# Patient Record
Sex: FEMALE | Race: OTHER | Employment: OTHER | URBAN - NONMETROPOLITAN AREA
[De-identification: names, ages, dates, MRNs, and addresses within clinical notes are randomized per-mention and may not be internally consistent; named-entity substitution may affect disease eponyms.]

---

## 2019-02-21 ENCOUNTER — TELEPHONE (OUTPATIENT)
Dept: CARDIOLOGY | Age: 69
End: 2019-02-21

## 2019-02-22 ENCOUNTER — TELEPHONE (OUTPATIENT)
Dept: CARDIOLOGY | Age: 69
End: 2019-02-22

## 2019-02-22 RX ORDER — PIOGLITAZONE HCL AND METFORMIN HCL 850; 15 MG/1; MG/1
0.2 TABLET ORAL 4 TIMES DAILY
COMMUNITY
End: 2019-09-12

## 2019-02-22 RX ORDER — RAMIPRIL 10 MG/1
10 CAPSULE ORAL 2 TIMES DAILY
COMMUNITY
End: 2021-02-26 | Stop reason: ALTCHOICE

## 2019-02-22 RX ORDER — EZETIMIBE 10 MG/1
10 TABLET ORAL DAILY
COMMUNITY

## 2019-02-22 RX ORDER — CLOTRIMAZOLE 10 MG/1
10 LOZENGE ORAL; TOPICAL
COMMUNITY
End: 2019-02-26 | Stop reason: ALTCHOICE

## 2019-02-22 RX ORDER — ALBUTEROL SULFATE 0.63 MG/3ML
1 SOLUTION RESPIRATORY (INHALATION) EVERY 6 HOURS PRN
COMMUNITY
End: 2019-02-22 | Stop reason: CLARIF

## 2019-02-22 RX ORDER — PERPHENAZINE/AMITRIPTYLINE HCL 4MG-10MG
1000 TABLET ORAL DAILY
COMMUNITY
End: 2019-09-12

## 2019-02-22 RX ORDER — ATENOLOL 25 MG/1
25 TABLET ORAL DAILY
COMMUNITY
End: 2021-02-26 | Stop reason: ALTCHOICE

## 2019-02-22 RX ORDER — HYDROCHLOROTHIAZIDE 12.5 MG/1
12.5 CAPSULE, GELATIN COATED ORAL DAILY
Status: ON HOLD | COMMUNITY
End: 2019-10-25

## 2019-02-22 RX ORDER — PIOGLITAZONE HCL AND METFORMIN HCL 500; 15 MG/1; MG/1
1 TABLET ORAL 2 TIMES DAILY WITH MEALS
COMMUNITY
End: 2019-02-22 | Stop reason: CLARIF

## 2019-02-22 RX ORDER — FENOFIBRATE 145 MG/1
145 TABLET, COATED ORAL DAILY
COMMUNITY
End: 2019-09-12

## 2019-02-22 RX ORDER — FENOFIBRATE 200 MG/1
200 CAPSULE ORAL
COMMUNITY
End: 2019-02-22 | Stop reason: CLARIF

## 2019-02-22 RX ORDER — MONTELUKAST SODIUM 10 MG/1
10 TABLET ORAL NIGHTLY
COMMUNITY

## 2019-02-26 ENCOUNTER — OFFICE VISIT (OUTPATIENT)
Dept: CARDIOLOGY | Age: 69
End: 2019-02-26
Payer: MEDICARE

## 2019-02-26 VITALS
HEIGHT: 61 IN | WEIGHT: 293 LBS | SYSTOLIC BLOOD PRESSURE: 102 MMHG | HEART RATE: 86 BPM | BODY MASS INDEX: 55.32 KG/M2 | DIASTOLIC BLOOD PRESSURE: 60 MMHG

## 2019-02-26 DIAGNOSIS — I10 ESSENTIAL HYPERTENSION: ICD-10-CM

## 2019-02-26 DIAGNOSIS — I50.32 CHRONIC DIASTOLIC HEART FAILURE (HCC): Primary | ICD-10-CM

## 2019-02-26 DIAGNOSIS — E78.5 DYSLIPIDEMIA: ICD-10-CM

## 2019-02-26 PROBLEM — I50.9 CHF (CONGESTIVE HEART FAILURE) (HCC): Status: ACTIVE | Noted: 2019-02-26

## 2019-02-26 PROCEDURE — 4040F PNEUMOC VAC/ADMIN/RCVD: CPT | Performed by: NURSE PRACTITIONER

## 2019-02-26 PROCEDURE — G8417 CALC BMI ABV UP PARAM F/U: HCPCS | Performed by: NURSE PRACTITIONER

## 2019-02-26 PROCEDURE — 99203 OFFICE O/P NEW LOW 30 MIN: CPT | Performed by: NURSE PRACTITIONER

## 2019-02-26 PROCEDURE — 93000 ELECTROCARDIOGRAM COMPLETE: CPT | Performed by: NURSE PRACTITIONER

## 2019-02-26 PROCEDURE — G8427 DOCREV CUR MEDS BY ELIG CLIN: HCPCS | Performed by: NURSE PRACTITIONER

## 2019-02-26 PROCEDURE — 1123F ACP DISCUSS/DSCN MKR DOCD: CPT | Performed by: NURSE PRACTITIONER

## 2019-02-26 PROCEDURE — 1090F PRES/ABSN URINE INCON ASSESS: CPT | Performed by: NURSE PRACTITIONER

## 2019-02-26 PROCEDURE — G8484 FLU IMMUNIZE NO ADMIN: HCPCS | Performed by: NURSE PRACTITIONER

## 2019-02-26 PROCEDURE — 3017F COLORECTAL CA SCREEN DOC REV: CPT | Performed by: NURSE PRACTITIONER

## 2019-02-26 PROCEDURE — G8400 PT W/DXA NO RESULTS DOC: HCPCS | Performed by: NURSE PRACTITIONER

## 2019-02-26 PROCEDURE — 1036F TOBACCO NON-USER: CPT | Performed by: NURSE PRACTITIONER

## 2019-02-26 PROCEDURE — 1101F PT FALLS ASSESS-DOCD LE1/YR: CPT | Performed by: NURSE PRACTITIONER

## 2019-02-26 RX ORDER — CHOLECALCIFEROL (VITAMIN D3) 1250 MCG
50000 CAPSULE ORAL
COMMUNITY
End: 2021-02-26 | Stop reason: ALTCHOICE

## 2019-02-26 RX ORDER — PETROLATUM,WHITE/LANOLIN
2000 OINTMENT (GRAM) TOPICAL DAILY
COMMUNITY
End: 2019-09-12

## 2019-02-26 RX ORDER — LEVALBUTEROL INHALATION SOLUTION 1.25 MG/3ML
1 SOLUTION RESPIRATORY (INHALATION) EVERY 4 HOURS PRN
COMMUNITY

## 2019-02-26 RX ORDER — FUROSEMIDE 40 MG/1
40 TABLET ORAL 2 TIMES DAILY
COMMUNITY
Start: 2019-02-21 | End: 2021-02-26 | Stop reason: ALTCHOICE

## 2019-02-26 RX ORDER — BUPROPION HYDROCHLORIDE 150 MG/1
150 TABLET ORAL DAILY
COMMUNITY
End: 2019-09-12

## 2019-02-26 RX ORDER — ICOSAPENT ETHYL 1000 MG/1
2 CAPSULE ORAL DAILY
Status: ON HOLD | COMMUNITY
End: 2019-10-25

## 2019-02-27 ENCOUNTER — TELEPHONE (OUTPATIENT)
Dept: CARDIOLOGY | Age: 69
End: 2019-02-27

## 2019-02-27 DIAGNOSIS — I50.42 CHRONIC COMBINED SYSTOLIC (CONGESTIVE) AND DIASTOLIC (CONGESTIVE) HEART FAILURE (HCC): Primary | ICD-10-CM

## 2019-05-09 ENCOUNTER — OFFICE VISIT (OUTPATIENT)
Dept: CARDIOLOGY | Age: 69
End: 2019-05-09
Payer: MEDICARE

## 2019-05-09 VITALS
BODY MASS INDEX: 47.09 KG/M2 | DIASTOLIC BLOOD PRESSURE: 74 MMHG | WEIGHT: 293 LBS | HEART RATE: 92 BPM | HEIGHT: 66 IN | SYSTOLIC BLOOD PRESSURE: 112 MMHG

## 2019-05-09 DIAGNOSIS — E78.5 DYSLIPIDEMIA: ICD-10-CM

## 2019-05-09 DIAGNOSIS — I51.89 DIASTOLIC DYSFUNCTION: ICD-10-CM

## 2019-05-09 DIAGNOSIS — I10 ESSENTIAL HYPERTENSION: Primary | ICD-10-CM

## 2019-05-09 DIAGNOSIS — Z72.0 TOBACCO ABUSE: ICD-10-CM

## 2019-05-09 PROBLEM — I50.9 CHF (CONGESTIVE HEART FAILURE) (HCC): Status: RESOLVED | Noted: 2019-02-26 | Resolved: 2019-05-09

## 2019-05-09 PROCEDURE — G8400 PT W/DXA NO RESULTS DOC: HCPCS | Performed by: NURSE PRACTITIONER

## 2019-05-09 PROCEDURE — 1123F ACP DISCUSS/DSCN MKR DOCD: CPT | Performed by: NURSE PRACTITIONER

## 2019-05-09 PROCEDURE — 1090F PRES/ABSN URINE INCON ASSESS: CPT | Performed by: NURSE PRACTITIONER

## 2019-05-09 PROCEDURE — G8417 CALC BMI ABV UP PARAM F/U: HCPCS | Performed by: NURSE PRACTITIONER

## 2019-05-09 PROCEDURE — G8427 DOCREV CUR MEDS BY ELIG CLIN: HCPCS | Performed by: NURSE PRACTITIONER

## 2019-05-09 PROCEDURE — 4040F PNEUMOC VAC/ADMIN/RCVD: CPT | Performed by: NURSE PRACTITIONER

## 2019-05-09 PROCEDURE — 1036F TOBACCO NON-USER: CPT | Performed by: NURSE PRACTITIONER

## 2019-05-09 PROCEDURE — 3017F COLORECTAL CA SCREEN DOC REV: CPT | Performed by: NURSE PRACTITIONER

## 2019-05-09 PROCEDURE — 99213 OFFICE O/P EST LOW 20 MIN: CPT | Performed by: NURSE PRACTITIONER

## 2019-05-09 RX ORDER — ALENDRONATE SODIUM 70 MG/1
70 TABLET ORAL
COMMUNITY
End: 2019-09-12

## 2019-05-09 RX ORDER — MULTIVITAMIN WITH IRON
250 TABLET ORAL DAILY
COMMUNITY
End: 2021-02-26 | Stop reason: ALTCHOICE

## 2019-05-09 NOTE — PROGRESS NOTES
Dear Joey Holt, [address], APRN - CNP,    Thank you for allowing me to participate in the care of Ms. Lm Snyder. She presents today at the [address] [place] [address] in the [facility]. As you know, Ms. Frederick Varma is a 76 y.o. female with history of hypertension, hyperlipidemia, diabetes, COPD and  hypomagnesemia who presents with the chief complaint of 2 month follow up. HTN-runs well   HLD-on tricor , pcp manages   Diastolic dysfunction-last Echo showed normal EF with diastolic dysfunction. Has SOA- no change. Weighs daily. Watching sodium and fluid intake. COPD-on oxygen     She otherwise denies chest pain, PND, orthopnea, syncope or near syncope. She has no other complaints. Review of Systems    Constitutional: Negative for fever, chills, diaphoresis, activity change, appetite change, fatigue and unexpected weight change. Eyes: Negative for photophobia, pain, redness and visual disturbance. Respiratory: Negative for apnea, cough, chest tightness,+ shortness of breath-chronic, wheezing and stridor. Cardiovascular: Negative for chest pain, palpitations and leg swelling. Gastrointestinal: Negative for abdominal distention. Genitourinary: Negative for dysuria, urgency and frequency. Musculoskeletal: Negative for myalgias, arthralgias and gait problem. Skin: Negative for color change, pallor, rash and wound. Neurological: Negative for dizziness, tremors, speech difficulty, weakness and numbness. Hematological: Does not bruise/bleed easily. Psychiatric/Behavioral: Negative.         Past Medical History:   Diagnosis Date    Arthritis     Chronic congestive heart failure with left ventricular diastolic dysfunction (HCC)     COPD with acute exacerbation (HCC)     Dyspnea     Essential hypertension     Hypomagnesemia     Type 2 diabetes mellitus ([facility] [address]        Past Surgical History:   Procedure Laterality Date    CYST REMOVAL  TONSILLECTOMY         History reviewed. No pertinent family history.     Social History     Socioeconomic History    Marital status:      Spouse name: Not on file    Number of children: Not on file    Years of education: Not on file    Highest education level: Not on file   Occupational History    Not on file   Social Needs    Financial resource strain: Not on file    Food insecurity:     Worry: Not on file     Inability: Not on file    Transportation needs:     Medical: Not on file     Non-medical: Not on file   Tobacco Use    Smoking status: Former Smoker     Packs/day: 1.50     Last attempt to quit: 2019     Years since quittin.2    Smokeless tobacco: Never Used   Substance and Sexual Activity    Alcohol use: Not on file    Drug use: Not on file    Sexual activity: Not on file   Lifestyle    Physical activity:     Days per week: Not on file     Minutes per session: Not on file    Stress: Not on file   Relationships    Social connections:     Talks on phone: Not on file     Gets together: Not on file     Attends Orthodox service: Not on file     Active member of club or organization: Not on file     Attends meetings of clubs or organizations: Not on file     Relationship status: Not on file    Intimate partner violence:     Fear of current or ex partner: Not on file     Emotionally abused: Not on file     Physically abused: Not on file     Forced sexual activity: Not on file   Other Topics Concern    Not on file   Social History Narrative    Not on file       Allergies   Allergen Reactions    Grapefruit Concentrate     Tetanus Immune Globulin     Zithromax [Azithromycin] Nausea Only         Current Outpatient Medications:     magnesium (MAGNESIUM-OXIDE) 250 MG TABS tablet, Take 250 mg by mouth daily, Disp: , Rfl:     Linaclotide (LINZESS) 72 MCG CAPS, Take 72 mcg by mouth daily, Disp: , Rfl:     alendronate (FOSAMAX) 70 MG tablet, Take 70 mg by mouth every 7 days, Disp: , Rfl:     Biotin 5000 MCG CAPS, Take 5,000 mcg by mouth daily, Disp: , Rfl:     buPROPion (WELLBUTRIN XL) 150 MG extended release tablet, Take 150 mg by mouth daily, Disp: , Rfl:     nystatin-triamcinolone (MYCOLOG II) 768005-9.1 UNIT/GM-% cream, Apply topically 4 times daily Apply topically 4 times daily. , Disp: , Rfl:     Icosapent Ethyl (VASCEPA) 1 g CAPS capsule, Take 2 capsules by mouth daily , Disp: , Rfl:     Cholecalciferol (VITAMIN D3) 01838 units CAPS, Take 50,000 Units by mouth once a week, Disp: , Rfl:     Potassium 99 MG TABS, Take 99 mg by mouth daily, Disp: , Rfl:     Probiotic Product (PROBIOTIC ADVANCED PO), Take by mouth 2 times daily, Disp: , Rfl:     Glucosamine Sulfate 1000 MG CAPS, Take 2,000 mg by mouth daily, Disp: , Rfl:     levalbuterol (XOPENEX) 1.25 MG/3ML nebulizer solution, Inhale 1 ampule into the lungs 3 times daily, Disp: , Rfl:     CANNABIDIOL PO, Take by mouth, Disp: , Rfl:     albuterol (PROVENTIL) 2 MG tablet, Take 2 mg by mouth 3 times daily, Disp: , Rfl:     aspirin 81 MG tablet, Take 81 mg by mouth 2 times daily, Disp: , Rfl:     atenolol (TENORMIN) 25 MG tablet, Take 25 mg by mouth daily, Disp: , Rfl:     Coconut Oil 1000 MG CAPS, Take 1,000 mg by mouth daily, Disp: , Rfl:     ezetimibe (ZETIA) 10 MG tablet, Take 10 mg by mouth daily, Disp: , Rfl:     fenofibrate (TRICOR) 145 MG tablet, Take 145 mg by mouth daily, Disp: , Rfl:     hydrochlorothiazide (MICROZIDE) 12.5 MG capsule, Take 12.5 mg by mouth daily, Disp: , Rfl:     montelukast (SINGULAIR) 10 MG tablet, Take 10 mg by mouth nightly, Disp: , Rfl:     Prenatal Vit-Fe Fumarate-FA (PX PRENATAL MULTIVITAMINS PO), Take by mouth, Disp: , Rfl:     pioglitazone-metformin (ACTOPLUS MET)  MG per tablet, Take 0.2 tablets by mouth 4 times daily , Disp: , Rfl:     ramipril (ALTACE) 10 MG capsule, Take 10 mg by mouth 2 times daily, Disp: , Rfl:     furosemide (LASIX) 40 MG tablet, Take 40 mg by mouth 2 times daily, Disp: , Rfl:     PE:  Vitals:    05/09/19 1025   BP: 112/74   Pulse: 92       Estimated body mass index is 48.42 kg/m² as calculated from the following:    Height as of this encounter: 5' 6\" (1.676 m). Weight as of this encounter: 300 lb (136.1 kg). Constitutional: She is oriented to person, place, and time. She appears well-developed and well-nourished in no acute distress. Morbidly obese  Head: Normocephalic and atraumatic. Neck:  Neck supple without JVD present. Cardiovascular: Normal rate, regular rhythm, normal heart sounds. no murmur ascultated. No gallop and no friction rub.  no carotid bruits. trace peripheral edema. Pulmonary/Chest:  Lungs clear to auscultation bilaterally without evidence of respiratory distress. She without wheezes. She without rales or ronchi. Musculoskeletal: Normal range of motion. Gait is normal with walker assitive device. Neurological: She is alert and oriented to person, place, and time. Skin: Skin is warm and dry without rash or pallor. Psychiatric: She has a normal mood and affect. Her behavior is normal. Thought content normal.     No results found for: CREATININE, HGB, PROBNP    Assessment    1. Essential hypertension    2. Dyslipidemia    3. Diastolic dysfunction    4. Tobacco abuse          Plan:    Diastolic dysfunction-watching sodium, weighing daily, watching sodium. BP controlled. Hypertension-controlled, no change  Dyslipidemia-PCP manages. Tobacco abuse-patient was smoking 1-1/2-2 packs per day for 45-50 years quitting on 2/1/19. She is however vaping but reports it is very rare now. COPD-On home oxygen. Disposition - RTC in 6 months or sooner if needed    Please do not hesitate to contact me for any questions or concerns. Sincerely yours,    KAMLESH Calzada    This dictation was generated by voice recognition computer software.  Although all attempts are made to edit dictation for accuracy, there may be errors in the transcription that are not intended.

## 2019-09-12 ENCOUNTER — OFFICE VISIT (OUTPATIENT)
Dept: CARDIOLOGY | Age: 69
End: 2019-09-12
Payer: MEDICARE

## 2019-09-12 VITALS
WEIGHT: 293 LBS | SYSTOLIC BLOOD PRESSURE: 148 MMHG | HEIGHT: 67 IN | BODY MASS INDEX: 45.99 KG/M2 | DIASTOLIC BLOOD PRESSURE: 98 MMHG | HEART RATE: 75 BPM

## 2019-09-12 DIAGNOSIS — E78.2 MIXED HYPERLIPIDEMIA: ICD-10-CM

## 2019-09-12 DIAGNOSIS — I10 ESSENTIAL HYPERTENSION: ICD-10-CM

## 2019-09-12 DIAGNOSIS — E66.01 CLASS 3 SEVERE OBESITY DUE TO EXCESS CALORIES WITH SERIOUS COMORBIDITY AND BODY MASS INDEX (BMI) OF 45.0 TO 49.9 IN ADULT (HCC): ICD-10-CM

## 2019-09-12 DIAGNOSIS — I50.30 DIASTOLIC HEART FAILURE, UNSPECIFIED HF CHRONICITY (HCC): Primary | ICD-10-CM

## 2019-09-12 DIAGNOSIS — Z87.448 HX OF CHRONIC KIDNEY DISEASE: ICD-10-CM

## 2019-09-12 DIAGNOSIS — R06.02 SOB (SHORTNESS OF BREATH): ICD-10-CM

## 2019-09-12 PROBLEM — E66.813 CLASS 3 SEVERE OBESITY DUE TO EXCESS CALORIES WITH SERIOUS COMORBIDITY AND BODY MASS INDEX (BMI) OF 45.0 TO 49.9 IN ADULT: Status: ACTIVE | Noted: 2019-09-12

## 2019-09-12 PROCEDURE — 99214 OFFICE O/P EST MOD 30 MIN: CPT | Performed by: NURSE PRACTITIONER

## 2019-09-12 PROCEDURE — G8400 PT W/DXA NO RESULTS DOC: HCPCS | Performed by: NURSE PRACTITIONER

## 2019-09-12 PROCEDURE — 1036F TOBACCO NON-USER: CPT | Performed by: NURSE PRACTITIONER

## 2019-09-12 PROCEDURE — G8417 CALC BMI ABV UP PARAM F/U: HCPCS | Performed by: NURSE PRACTITIONER

## 2019-09-12 PROCEDURE — G8427 DOCREV CUR MEDS BY ELIG CLIN: HCPCS | Performed by: NURSE PRACTITIONER

## 2019-09-12 PROCEDURE — 1090F PRES/ABSN URINE INCON ASSESS: CPT | Performed by: NURSE PRACTITIONER

## 2019-09-12 PROCEDURE — 3017F COLORECTAL CA SCREEN DOC REV: CPT | Performed by: NURSE PRACTITIONER

## 2019-09-12 PROCEDURE — 1123F ACP DISCUSS/DSCN MKR DOCD: CPT | Performed by: NURSE PRACTITIONER

## 2019-09-12 PROCEDURE — 4040F PNEUMOC VAC/ADMIN/RCVD: CPT | Performed by: NURSE PRACTITIONER

## 2019-09-12 RX ORDER — ALBUTEROL SULFATE 0.63 MG/3ML
1 SOLUTION RESPIRATORY (INHALATION) 3 TIMES DAILY PRN
Status: ON HOLD | COMMUNITY
End: 2019-10-25

## 2019-09-12 RX ORDER — GEMFIBROZIL 600 MG/1
600 TABLET, FILM COATED ORAL
COMMUNITY

## 2019-09-12 RX ORDER — PIOGLITAZONEHYDROCHLORIDE 45 MG/1
45 TABLET ORAL DAILY
Status: ON HOLD | COMMUNITY
End: 2019-10-25

## 2019-09-12 RX ORDER — FOLIC ACID 1 MG/1
1 TABLET ORAL DAILY
Status: ON HOLD | COMMUNITY
End: 2019-10-25

## 2019-09-12 NOTE — PATIENT INSTRUCTIONS
know your test results and keep a list of the medicines you take. How can you care for yourself at home? Watch for changes in your weight and condition  · Weigh yourself without clothing at the same time each day. Record your weight. Call your doctor if you have sudden weight gain, such as more than 2 to 3 pounds in a day or 5 pounds in a week. (Your doctor may suggest a different range of weight gain.) A sudden weight gain may mean that your heart failure is getting worse. · Keep a daily record of your symptoms. Write down any changes in how you feel, such as new shortness of breath, cough, or problems eating. Also record if your ankles are more swollen than usual and if you feel more tired than usual. Note anything that you ate or did that could have triggered these changes. Limit sodium  Sodium causes your body to hold on to extra water. This may cause your heart failure symptoms to get worse. People get most of their sodium from processed foods. Fast food and restaurant meals also tend to be very high in sodium. · Your doctor may suggest that you limit sodium to 2,000 milligrams (mg) a day or less. That is less than 1 teaspoon of salt a day, including all the salt you eat in cooking or in packaged foods. · Read food labels on cans and food packages. They tell you how much sodium you get in one serving. Check the serving size. If you eat more than one serving, you are getting more sodium. · Be aware that sodium can come in forms other than salt, including monosodium glutamate (MSG), sodium citrate, and sodium bicarbonate (baking soda). MSG is often added to Asian food. You can sometimes ask for food without MSG or salt. · Slowly reducing salt will help you adjust to the taste. Take the salt shaker off the table. · Flavor your food with garlic, lemon juice, onion, vinegar, herbs, and spices instead of salt.  Do not use soy sauce, steak sauce, onion salt, garlic salt, mustard, or ketchup on your food,

## 2019-09-12 NOTE — PROGRESS NOTES
Kory Almanza has the following history as recorded in [facility]:  Patient Active Problem List   Diagnosis Code    Essential hypertension B72    Diastolic heart failure (HCC) I50.30    Mixed hyperlipidemia E78.2    Hx of chronic kidney disease Z87.448    Class 3 severe obesity due to excess calories with serious comorbidity and body mass index (BMI) of 45.0 to 49.9 in adult ([facility] [address] E66.01, Z68.42    SOB (shortness of breath) R06.02     Past Medical History:   Diagnosis Date    Arthritis     Chronic congestive heart failure with left ventricular diastolic dysfunction ([facility] [address]     COPD with acute exacerbation (HCC)     Dyspnea     Essential hypertension     Hypomagnesemia     Osteoporosis     Type 2 diabetes mellitus ([facility] [address]      Past Surgical History:   Procedure Laterality Date    CYST REMOVAL      TONSILLECTOMY       History reviewed. No pertinent family history. Social History     Tobacco Use    Smoking status: Former Smoker     Packs/day: 1.50     Last attempt to quit: 2019     Years since quittin.6    Smokeless tobacco: Never Used   Substance Use Topics    Alcohol use: Not on file      Current Outpatient Medications   Medication Sig Dispense Refill    albuterol (ACCUNEB) 0.63 MG/3ML nebulizer solution albuterol sulfate      Fluticasone-Umeclidin-Vilant (TRELEGY ELLIPTA IN) Inhale into the lungs      gemfibrozil (LOPID) 600 MG tablet Take 600 mg by mouth 2 times daily (before meals)      folic acid (FOLVITE) 1 MG tablet Take 1 mg by mouth daily      pioglitazone (ACTOS) 45 MG tablet Take 45 mg by mouth daily      magnesium (MAGNESIUM-OXIDE) 250 MG TABS tablet Take 250 mg by mouth daily      Linaclotide (LINZESS) 72 MCG CAPS Take 72 mcg by mouth daily      nystatin-triamcinolone (MYCOLOG II) 859762-3.1 UNIT/GM-% cream Apply topically 4 times daily Apply topically 4 times daily.       Icosapent Ethyl (VASCEPA) 1 g CAPS capsule Take 2 capsules by mouth daily       Cholecalciferol (VITAMIN D3) 18289 units CAPS Take 150,000 Units by mouth once a week       Potassium 99 MG TABS Take 99 mg by mouth daily      Probiotic Product (PROBIOTIC ADVANCED PO) Take by mouth 2 times daily      levalbuterol (XOPENEX) 1.25 MG/3ML nebulizer solution Inhale 1 ampule into the lungs 3 times daily      CANNABIDIOL PO Take by mouth      albuterol (PROVENTIL) 2 MG tablet Take 2 mg by mouth 3 times daily      aspirin 81 MG tablet Take 81 mg by mouth 2 times daily      atenolol (TENORMIN) 25 MG tablet Take 25 mg by mouth daily      ezetimibe (ZETIA) 10 MG tablet Take 10 mg by mouth daily      hydrochlorothiazide (MICROZIDE) 12.5 MG capsule Take 12.5 mg by mouth daily      montelukast (SINGULAIR) 10 MG tablet Take 10 mg by mouth nightly      Prenatal Vit-Fe Fumarate-FA (PX PRENATAL MULTIVITAMINS PO) Take by mouth      ramipril (ALTACE) 10 MG capsule Take 10 mg by mouth 2 times daily      furosemide (LASIX) 40 MG tablet Take 40 mg by mouth 2 times daily       No current facility-administered medications for this visit. Allergies: Grapefruit concentrate; Tetanus immune globulin; and Zithromax [azithromycin]    Review of Systems  Constitutional - no appetite change, or unexpected weight change. No fever, chills or diaphoresis. No significant change in activity level or new onset of fatigue. HEENT - no significant rhinorrhea or epistaxis. No tinnitus or significant hearing loss. Eyes - no sudden vision change or amaurosis. No corneal arcus, xantholasma, subconjunctival hemorrhage or discharge. Respiratory - no significant wheezing, stridor, apnea or cough. +chronic but stable AUGUSTINE. Cardiovascular - no exertional chest pain to suggest myocardial ischemia. No orthopnea or PND. No sensation of sustained arrythmia. No occurrence of slow heart rate. No palpitations. No claudication. Gastrointestinal - no abdominal swelling or pain. No blood in stool.  No severe constipation, diarrhea, nausea,

## 2019-10-11 ENCOUNTER — HOSPITAL ENCOUNTER (OUTPATIENT)
Dept: NUCLEAR MEDICINE | Age: 69
Discharge: HOME | End: 2019-10-13
Payer: MEDICARE

## 2019-10-11 ENCOUNTER — HOSPITAL ENCOUNTER (OUTPATIENT)
Dept: NON INVASIVE DIAGNOSTICS | Age: 69
Discharge: HOME | End: 2019-10-11
Payer: MEDICARE

## 2019-10-11 DIAGNOSIS — I50.30 DIASTOLIC HEART FAILURE, UNSPECIFIED HF CHRONICITY (HCC): ICD-10-CM

## 2019-10-11 DIAGNOSIS — I10 ESSENTIAL HYPERTENSION: ICD-10-CM

## 2019-10-11 DIAGNOSIS — R06.02 SOB (SHORTNESS OF BREATH): ICD-10-CM

## 2019-10-11 PROCEDURE — 3430000000 HC RX DIAGNOSTIC RADIOPHARMACEUTICAL: Performed by: NURSE PRACTITIONER

## 2019-10-11 PROCEDURE — 78452 HT MUSCLE IMAGE SPECT MULT: CPT

## 2019-10-11 PROCEDURE — 93017 CV STRESS TEST TRACING ONLY: CPT

## 2019-10-11 PROCEDURE — A9500 TC99M SESTAMIBI: HCPCS | Performed by: NURSE PRACTITIONER

## 2019-10-11 PROCEDURE — 6360000002 HC RX W HCPCS: Performed by: INTERNAL MEDICINE

## 2019-10-11 RX ADMIN — TETRAKIS(2-METHOXYISOBUTYLISOCYANIDE)COPPER(I) TETRAFLUOROBORATE 10 MILLICURIE: 1 INJECTION, POWDER, LYOPHILIZED, FOR SOLUTION INTRAVENOUS at 14:27

## 2019-10-11 RX ADMIN — TETRAKIS(2-METHOXYISOBUTYLISOCYANIDE)COPPER(I) TETRAFLUOROBORATE 30 MILLICURIE: 1 INJECTION, POWDER, LYOPHILIZED, FOR SOLUTION INTRAVENOUS at 14:26

## 2019-10-11 RX ADMIN — REGADENOSON 0.4 MG: 0.08 INJECTION, SOLUTION INTRAVENOUS at 14:00

## 2019-10-13 LAB
LV EF: 60 %
LVEF MODALITY: NORMAL

## 2019-10-15 ENCOUNTER — OFFICE VISIT (OUTPATIENT)
Dept: CARDIOLOGY | Age: 69
End: 2019-10-15
Payer: MEDICARE

## 2019-10-15 VITALS
BODY MASS INDEX: 45.64 KG/M2 | SYSTOLIC BLOOD PRESSURE: 80 MMHG | HEART RATE: 68 BPM | WEIGHT: 284 LBS | DIASTOLIC BLOOD PRESSURE: 62 MMHG | HEIGHT: 66 IN

## 2019-10-15 DIAGNOSIS — R06.02 SOB (SHORTNESS OF BREATH): ICD-10-CM

## 2019-10-15 DIAGNOSIS — I50.30 DIASTOLIC HEART FAILURE, UNSPECIFIED HF CHRONICITY (HCC): ICD-10-CM

## 2019-10-15 DIAGNOSIS — Z82.49 FAMILY HISTORY OF CORONARY ARTERY DISEASE: ICD-10-CM

## 2019-10-15 DIAGNOSIS — R94.39 ABNORMAL NUCLEAR STRESS TEST: Primary | ICD-10-CM

## 2019-10-15 DIAGNOSIS — E78.2 MIXED HYPERLIPIDEMIA: ICD-10-CM

## 2019-10-15 DIAGNOSIS — I10 ESSENTIAL HYPERTENSION: ICD-10-CM

## 2019-10-15 PROCEDURE — 3017F COLORECTAL CA SCREEN DOC REV: CPT | Performed by: NURSE PRACTITIONER

## 2019-10-15 PROCEDURE — 1036F TOBACCO NON-USER: CPT | Performed by: NURSE PRACTITIONER

## 2019-10-15 PROCEDURE — G8400 PT W/DXA NO RESULTS DOC: HCPCS | Performed by: NURSE PRACTITIONER

## 2019-10-15 PROCEDURE — G8417 CALC BMI ABV UP PARAM F/U: HCPCS | Performed by: NURSE PRACTITIONER

## 2019-10-15 PROCEDURE — G8598 ASA/ANTIPLAT THER USED: HCPCS | Performed by: NURSE PRACTITIONER

## 2019-10-15 PROCEDURE — 99214 OFFICE O/P EST MOD 30 MIN: CPT | Performed by: NURSE PRACTITIONER

## 2019-10-15 PROCEDURE — 1090F PRES/ABSN URINE INCON ASSESS: CPT | Performed by: NURSE PRACTITIONER

## 2019-10-15 PROCEDURE — 1123F ACP DISCUSS/DSCN MKR DOCD: CPT | Performed by: NURSE PRACTITIONER

## 2019-10-15 PROCEDURE — G8484 FLU IMMUNIZE NO ADMIN: HCPCS | Performed by: NURSE PRACTITIONER

## 2019-10-15 PROCEDURE — 4040F PNEUMOC VAC/ADMIN/RCVD: CPT | Performed by: NURSE PRACTITIONER

## 2019-10-15 PROCEDURE — G8427 DOCREV CUR MEDS BY ELIG CLIN: HCPCS | Performed by: NURSE PRACTITIONER

## 2019-10-15 RX ORDER — FUROSEMIDE 40 MG/1
40 TABLET ORAL 2 TIMES DAILY
COMMUNITY

## 2019-10-15 RX ORDER — GLIMEPIRIDE 2 MG/1
2 TABLET ORAL 2 TIMES DAILY
COMMUNITY

## 2019-10-15 SDOH — HEALTH STABILITY: MENTAL HEALTH: HOW OFTEN DO YOU HAVE A DRINK CONTAINING ALCOHOL?: NEVER

## 2019-10-16 ENCOUNTER — TELEPHONE (OUTPATIENT)
Dept: CARDIOLOGY | Age: 69
End: 2019-10-16

## 2019-10-16 ENCOUNTER — OFFICE VISIT (OUTPATIENT)
Dept: CARDIOLOGY | Age: 69
End: 2019-10-16
Payer: MEDICARE

## 2019-10-16 VITALS
BODY MASS INDEX: 45.64 KG/M2 | SYSTOLIC BLOOD PRESSURE: 90 MMHG | DIASTOLIC BLOOD PRESSURE: 58 MMHG | HEART RATE: 80 BPM | HEIGHT: 66 IN | WEIGHT: 284 LBS

## 2019-10-16 DIAGNOSIS — E08.01 DIABETES MELLITUS DUE TO UNDERLYING CONDITION WITH HYPEROSMOLARITY AND COMA, WITHOUT LONG-TERM CURRENT USE OF INSULIN (HCC): ICD-10-CM

## 2019-10-16 DIAGNOSIS — I25.10 CORONARY ARTERY DISEASE INVOLVING NATIVE CORONARY ARTERY, ANGINA PRESENCE UNSPECIFIED, UNSPECIFIED WHETHER NATIVE OR TRANSPLANTED HEART: ICD-10-CM

## 2019-10-16 DIAGNOSIS — I25.10 CORONARY ARTERY DISEASE INVOLVING NATIVE CORONARY ARTERY, ANGINA PRESENCE UNSPECIFIED, UNSPECIFIED WHETHER NATIVE OR TRANSPLANTED HEART: Primary | ICD-10-CM

## 2019-10-16 LAB
ALBUMIN SERPL-MCNC: 4 G/DL (ref 3.5–5.2)
ALP BLD-CCNC: 74 U/L (ref 35–104)
ALT SERPL-CCNC: 20 U/L (ref 5–33)
ANION GAP SERPL CALCULATED.3IONS-SCNC: 17 MMOL/L (ref 7–19)
AST SERPL-CCNC: 30 U/L (ref 5–32)
BILIRUB SERPL-MCNC: 0.3 MG/DL (ref 0.2–1.2)
BUN BLDV-MCNC: 34 MG/DL (ref 8–23)
CALCIUM SERPL-MCNC: 9.7 MG/DL (ref 8.8–10.2)
CHLORIDE BLD-SCNC: 100 MMOL/L (ref 98–111)
CHOLESTEROL, TOTAL: 177 MG/DL (ref 160–199)
CO2: 27 MMOL/L (ref 22–29)
CREAT SERPL-MCNC: 1.4 MG/DL (ref 0.5–0.9)
GFR NON-AFRICAN AMERICAN: 37
GLUCOSE BLD-MCNC: 246 MG/DL (ref 74–109)
HBA1C MFR BLD: 8.5 % (ref 4–6)
HDLC SERPL-MCNC: 31 MG/DL (ref 65–121)
LDL CHOLESTEROL CALCULATED: 86 MG/DL
POTASSIUM SERPL-SCNC: 4.3 MMOL/L (ref 3.5–5)
SODIUM BLD-SCNC: 144 MMOL/L (ref 136–145)
TOTAL PROTEIN: 8.4 G/DL (ref 6.6–8.7)
TRIGL SERPL-MCNC: 299 MG/DL (ref 0–149)

## 2019-10-16 PROCEDURE — 99213 OFFICE O/P EST LOW 20 MIN: CPT | Performed by: INTERNAL MEDICINE

## 2019-10-16 ASSESSMENT — ENCOUNTER SYMPTOMS
VOMITING: 0
DIARRHEA: 0
BACK PAIN: 1
COUGH: 0
SHORTNESS OF BREATH: 1
WHEEZING: 0
EYE DISCHARGE: 0
CONSTIPATION: 0
ABDOMINAL DISTENTION: 0
BLOOD IN STOOL: 0

## 2019-10-22 ENCOUNTER — TELEPHONE (OUTPATIENT)
Dept: CARDIOLOGY | Age: 69
End: 2019-10-22

## 2019-10-22 ENCOUNTER — HOSPITAL ENCOUNTER (OUTPATIENT)
Dept: NON INVASIVE DIAGNOSTICS | Age: 69
Discharge: HOME | End: 2019-10-22
Payer: MEDICARE

## 2019-10-22 DIAGNOSIS — I25.10 CORONARY ARTERY DISEASE INVOLVING NATIVE CORONARY ARTERY, ANGINA PRESENCE UNSPECIFIED, UNSPECIFIED WHETHER NATIVE OR TRANSPLANTED HEART: ICD-10-CM

## 2019-10-22 LAB
LV EF: 58 %
LVEF MODALITY: NORMAL

## 2019-10-22 PROCEDURE — 93306 TTE W/DOPPLER COMPLETE: CPT

## 2019-10-25 ENCOUNTER — HOSPITAL ENCOUNTER (OUTPATIENT)
Dept: CARDIAC CATH/INVASIVE PROCEDURES | Age: 69
Discharge: HOME | End: 2019-10-25
Attending: INTERNAL MEDICINE | Admitting: INTERNAL MEDICINE
Payer: MEDICARE

## 2019-10-25 VITALS
TEMPERATURE: 98.3 F | BODY MASS INDEX: 45.64 KG/M2 | RESPIRATION RATE: 20 BRPM | HEIGHT: 66 IN | OXYGEN SATURATION: 95 % | DIASTOLIC BLOOD PRESSURE: 81 MMHG | SYSTOLIC BLOOD PRESSURE: 114 MMHG | HEART RATE: 86 BPM | WEIGHT: 284 LBS

## 2019-10-25 LAB
HCT VFR BLD CALC: 37.2 % (ref 37–47)
HEMOGLOBIN: 11.4 G/DL (ref 12–16)
MCH RBC QN AUTO: 29.5 PG (ref 27–31)
MCHC RBC AUTO-ENTMCNC: 30.6 G/DL (ref 33–37)
MCV RBC AUTO: 96.1 FL (ref 81–99)
PDW BLD-RTO: 14.7 % (ref 11.5–14.5)
PLATELET # BLD: 272 K/UL (ref 130–400)
PMV BLD AUTO: 11.4 FL (ref 9.4–12.3)
RBC # BLD: 3.87 M/UL (ref 4.2–5.4)
WBC # BLD: 6.1 K/UL (ref 4.8–10.8)

## 2019-10-25 PROCEDURE — 2709999900 HC NON-CHARGEABLE SUPPLY

## 2019-10-25 PROCEDURE — 36415 COLL VENOUS BLD VENIPUNCTURE: CPT

## 2019-10-25 PROCEDURE — C1894 INTRO/SHEATH, NON-LASER: HCPCS

## 2019-10-25 PROCEDURE — 2500000003 HC RX 250 WO HCPCS: Performed by: INTERNAL MEDICINE

## 2019-10-25 PROCEDURE — 93458 L HRT ARTERY/VENTRICLE ANGIO: CPT

## 2019-10-25 PROCEDURE — 99152 MOD SED SAME PHYS/QHP 5/>YRS: CPT | Performed by: INTERNAL MEDICINE

## 2019-10-25 PROCEDURE — 6360000002 HC RX W HCPCS

## 2019-10-25 PROCEDURE — 6360000004 HC RX CONTRAST MEDICATION: Performed by: INTERNAL MEDICINE

## 2019-10-25 PROCEDURE — 2580000003 HC RX 258: Performed by: INTERNAL MEDICINE

## 2019-10-25 PROCEDURE — C1887 CATHETER, GUIDING: HCPCS

## 2019-10-25 PROCEDURE — 93458 L HRT ARTERY/VENTRICLE ANGIO: CPT | Performed by: INTERNAL MEDICINE

## 2019-10-25 PROCEDURE — 2500000003 HC RX 250 WO HCPCS

## 2019-10-25 PROCEDURE — C1769 GUIDE WIRE: HCPCS

## 2019-10-25 PROCEDURE — 85027 COMPLETE CBC AUTOMATED: CPT

## 2019-10-25 PROCEDURE — 99152 MOD SED SAME PHYS/QHP 5/>YRS: CPT

## 2019-10-25 RX ORDER — ACETAMINOPHEN 500 MG
500 TABLET ORAL EVERY 6 HOURS PRN
COMMUNITY

## 2019-10-25 RX ORDER — IODIXANOL 320 MG/ML
100 INJECTION, SOLUTION INTRAVASCULAR
Status: COMPLETED | OUTPATIENT
Start: 2019-10-25 | End: 2019-10-25

## 2019-10-25 RX ORDER — SODIUM CHLORIDE 0.9 % (FLUSH) 0.9 %
10 SYRINGE (ML) INJECTION EVERY 12 HOURS SCHEDULED
Status: DISCONTINUED | OUTPATIENT
Start: 2019-10-25 | End: 2019-10-25 | Stop reason: HOSPADM

## 2019-10-25 RX ORDER — ASPIRIN 325 MG
325 TABLET ORAL ONCE
Status: DISCONTINUED | OUTPATIENT
Start: 2019-10-25 | End: 2019-10-25 | Stop reason: HOSPADM

## 2019-10-25 RX ORDER — ONDANSETRON 2 MG/ML
4 INJECTION INTRAMUSCULAR; INTRAVENOUS EVERY 6 HOURS PRN
Status: DISCONTINUED | OUTPATIENT
Start: 2019-10-25 | End: 2019-10-25 | Stop reason: HOSPADM

## 2019-10-25 RX ORDER — NITROGLYCERIN 0.4 MG/1
0.4 TABLET SUBLINGUAL EVERY 5 MIN PRN
Status: DISCONTINUED | OUTPATIENT
Start: 2019-10-25 | End: 2019-10-25 | Stop reason: HOSPADM

## 2019-10-25 RX ORDER — SODIUM CHLORIDE 0.9 % (FLUSH) 0.9 %
10 SYRINGE (ML) INJECTION PRN
Status: DISCONTINUED | OUTPATIENT
Start: 2019-10-25 | End: 2019-10-25 | Stop reason: HOSPADM

## 2019-10-25 RX ORDER — SODIUM CHLORIDE 9 MG/ML
INJECTION, SOLUTION INTRAVENOUS CONTINUOUS
Status: DISCONTINUED | OUTPATIENT
Start: 2019-10-25 | End: 2019-10-25 | Stop reason: HOSPADM

## 2019-10-25 RX ORDER — ACETAMINOPHEN 325 MG/1
650 TABLET ORAL EVERY 4 HOURS PRN
Status: DISCONTINUED | OUTPATIENT
Start: 2019-10-25 | End: 2019-10-25 | Stop reason: HOSPADM

## 2019-10-25 RX ORDER — ACETAMINOPHEN, ASPIRIN AND CAFFEINE 250; 250; 65 MG/1; MG/1; MG/1
1 TABLET, FILM COATED ORAL EVERY 6 HOURS PRN
COMMUNITY

## 2019-10-25 RX ORDER — ALPRAZOLAM 0.5 MG/1
0.5 TABLET ORAL
Status: DISCONTINUED | OUTPATIENT
Start: 2019-10-25 | End: 2019-10-25 | Stop reason: HOSPADM

## 2019-10-25 RX ADMIN — IODIXANOL 50 ML: 320 INJECTION, SOLUTION INTRAVASCULAR at 14:27

## 2019-10-25 RX ADMIN — SODIUM BICARBONATE: 84 INJECTION, SOLUTION INTRAVENOUS at 12:17

## 2020-01-22 ENCOUNTER — OFFICE VISIT (OUTPATIENT)
Dept: CARDIOLOGY | Age: 70
End: 2020-01-22
Payer: MEDICARE

## 2020-01-22 VITALS
DIASTOLIC BLOOD PRESSURE: 80 MMHG | SYSTOLIC BLOOD PRESSURE: 138 MMHG | HEIGHT: 67 IN | HEART RATE: 80 BPM | WEIGHT: 281 LBS | BODY MASS INDEX: 44.1 KG/M2

## 2020-01-22 PROCEDURE — 1123F ACP DISCUSS/DSCN MKR DOCD: CPT | Performed by: INTERNAL MEDICINE

## 2020-01-22 PROCEDURE — 1036F TOBACCO NON-USER: CPT | Performed by: INTERNAL MEDICINE

## 2020-01-22 PROCEDURE — G8400 PT W/DXA NO RESULTS DOC: HCPCS | Performed by: INTERNAL MEDICINE

## 2020-01-22 PROCEDURE — 4040F PNEUMOC VAC/ADMIN/RCVD: CPT | Performed by: INTERNAL MEDICINE

## 2020-01-22 PROCEDURE — 1090F PRES/ABSN URINE INCON ASSESS: CPT | Performed by: INTERNAL MEDICINE

## 2020-01-22 PROCEDURE — G8427 DOCREV CUR MEDS BY ELIG CLIN: HCPCS | Performed by: INTERNAL MEDICINE

## 2020-01-22 PROCEDURE — G8417 CALC BMI ABV UP PARAM F/U: HCPCS | Performed by: INTERNAL MEDICINE

## 2020-01-22 PROCEDURE — G8484 FLU IMMUNIZE NO ADMIN: HCPCS | Performed by: INTERNAL MEDICINE

## 2020-01-22 PROCEDURE — 99213 OFFICE O/P EST LOW 20 MIN: CPT | Performed by: INTERNAL MEDICINE

## 2020-01-22 PROCEDURE — 3017F COLORECTAL CA SCREEN DOC REV: CPT | Performed by: INTERNAL MEDICINE

## 2020-01-22 RX ORDER — FOLIC ACID 1 MG/1
1 TABLET ORAL DAILY
COMMUNITY

## 2020-01-22 RX ORDER — ICOSAPENT ETHYL 1000 MG/1
2 CAPSULE ORAL DAILY
COMMUNITY
End: 2021-02-26 | Stop reason: SINTOL

## 2020-01-22 RX ORDER — IBUPROFEN 200 MG
200 TABLET ORAL EVERY 6 HOURS PRN
COMMUNITY

## 2020-01-22 ASSESSMENT — ENCOUNTER SYMPTOMS
CONSTIPATION: 0
BACK PAIN: 0
SHORTNESS OF BREATH: 1
COUGH: 0
VOMITING: 0
EYE DISCHARGE: 0
DIARRHEA: 0
WHEEZING: 0
ABDOMINAL DISTENTION: 0
BLOOD IN STOOL: 0

## 2020-01-22 NOTE — PROGRESS NOTES
[facility]  Cardiology Office Note  [address] [address]  Phone: [phone]  Fax: [phone]                            Date:  1/22/2020  Patient: Jordy Glez  Age:  71 y.o., 1950    Referral: KAMLESH Callahan *      PROBLEM LIST:    Patient Active Problem List    Diagnosis Date Noted    Coronary artery disease involving native coronary artery      Priority: High    Family history of coronary artery disease 10/15/2019     Priority: Low    Abnormal nuclear stress test 10/15/2019     Priority: Low    Diastolic heart failure ([facility] [address] 09/12/2019     Priority: Low    Mixed hyperlipidemia 09/12/2019     Priority: Low    Hx of chronic kidney disease 09/12/2019     Priority: Low    Class 3 severe obesity due to excess calories with serious comorbidity and body mass index (BMI) of 45.0 to 49.9 in adult ([facility] [address] 09/12/2019     Priority: Low    SOB (shortness of breath) 09/12/2019     Priority: Low    Essential hypertension      Priority: Low     1.  Diabetes mellitus long-standing not on insulin. 2.  Mild coronary artery disease with EKG with possible anterolateral and inferior infarct, inferolateral defect on Lexiscan, ejection fraction 60%, cardiac catheterization 10/25/2019 with mid LAD 35%. 3.  Long-standing tobacco use x50 years, stopped 2018 with COPD on continuous oxygen  4.  Severe obesity with limited ambulation. 5.  CKD stage III    PRESENTATION: Jordy Glez is a 71y.o. year old female presents for follow-up evaluation. Cardiac catheterization revealed only mild nonobstructive CAD with mid LAD 35% stenosis. She reports no chest pain. She is on medications for her COPD and does have some shortness of breath which is stable. REVIEW OF SYSTEMS:  Review of Systems   Constitutional: Negative for activity change, fatigue and fever. HENT: Negative for ear pain, hearing loss and tinnitus.     Eyes: Negative for discharge and visual disturbance. Respiratory: Positive for shortness of breath. Negative for cough and wheezing. Cardiovascular: Negative for chest pain, palpitations and leg swelling. Gastrointestinal: Negative for abdominal distention, blood in stool, constipation, diarrhea and vomiting. Endocrine: Negative for cold intolerance, heat intolerance, polydipsia and polyuria. Genitourinary: Negative for dysuria and hematuria. Musculoskeletal: Negative for arthralgias, back pain and myalgias. Skin: Negative for pallor and rash. Neurological: Negative for seizures, syncope, weakness and headaches. Psychiatric/Behavioral: Negative for behavioral problems and dysphoric mood.        Past Medical History:      Diagnosis Date    Arthritis     Chronic congestive heart failure with left ventricular diastolic dysfunction ([facility] [address]     COPD with acute exacerbation (HCC)     Coronary artery disease involving native coronary artery     Dyspnea     Essential hypertension     Hyperlipidemia     Hypomagnesemia     Osteoporosis     Type 2 diabetes mellitus (HCC)        Past Surgical History:      Procedure Laterality Date    COLONOSCOPY      CYST REMOVAL      TONSILLECTOMY         Medications:  Current Outpatient Medications   Medication Sig Dispense Refill    folic acid (FOLVITE) 1 MG tablet Take 1 mg by mouth daily      Exenatide (BYDUREON SC) Inject into the skin once a week      Icosapent Ethyl (VASCEPA) 1 g CAPS capsule Take 2 capsules by mouth daily      ibuprofen (ADVIL;MOTRIN) 200 MG tablet Take 200 mg by mouth every 6 hours as needed for Pain      OXYGEN Inhale into the lungs      acetaminophen (TYLENOL) 500 MG tablet Take 500 mg by mouth every 6 hours as needed for Pain      aspirin-acetaminophen-caffeine (EXCEDRIN MIGRAINE) 250-250-65 MG per tablet Take 1 tablet by mouth every 6 hours as needed for Headaches      glimepiride (AMARYL) 2 MG tablet Take 2 mg by mouth 2 times daily      gemfibrozil (LOPID) 600 MG tablet Take 600 mg by mouth 2 times daily (before meals)      magnesium (MAGNESIUM-OXIDE) 250 MG TABS tablet Take 250 mg by mouth daily      Linaclotide (LINZESS) 72 MCG CAPS Take 72 mcg by mouth daily      nystatin-triamcinolone (MYCOLOG II) 200537-5.1 UNIT/GM-% cream Apply topically as needed       Cholecalciferol (VITAMIN D3) 66101 units CAPS Take 50,000 Units by mouth Twice a Week       Probiotic Product (PROBIOTIC ADVANCED PO) Take 1 tablet by mouth 2 times daily       levalbuterol (XOPENEX) 1.25 MG/3ML nebulizer solution Inhale 1 ampule into the lungs every 4 hours as needed       CANNABIDIOL PO Take by mouth daily       albuterol (PROVENTIL) 2 MG tablet Take 2 mg by mouth 2 times daily       aspirin 81 MG tablet Take 81 mg by mouth daily       atenolol (TENORMIN) 25 MG tablet Take 25 mg by mouth daily      ezetimibe (ZETIA) 10 MG tablet Take 10 mg by mouth daily      montelukast (SINGULAIR) 10 MG tablet Take 10 mg by mouth nightly      Prenatal Vit-Fe Fumarate-FA (PX PRENATAL MULTIVITAMINS PO) Take 1 tablet by mouth daily       ramipril (ALTACE) 10 MG capsule Take 10 mg by mouth 2 times daily      Omega-3 Fatty Acids (FISH OIL) 1000 MG CAPS Take 1,000 mg by mouth daily      furosemide (LASIX) 40 MG tablet Take 40 mg by mouth 2 times daily      furosemide (LASIX) 40 MG tablet Take 40 mg by mouth 2 times daily      Potassium 99 MG TABS Take 99 mg by mouth daily       No current facility-administered medications for this visit. Allergies:  Grapefruit concentrate;  Tetanus immune globulin; and Zithromax [azithromycin]    Past Social History:  Social History     Socioeconomic History    Marital status:      Spouse name: Not on file    Number of children: Not on file    Years of education: Not on file    Highest education level: Not on file   Occupational History    Not on file   Social Needs    Financial resource strain: Not on file    Food insecurity:     Worry: Not on file     Inability: Not on file    Transportation needs:     Medical: Not on file     Non-medical: Not on file   Tobacco Use    Smoking status: Former Smoker     Packs/day: 0.00     Types: Cigarettes    Smokeless tobacco: Never Used   Substance and Sexual Activity    Alcohol use: Yes     Frequency: Never     Comment: occassionally    Drug use: Never    Sexual activity: Not on file   Lifestyle    Physical activity:     Days per week: Not on file     Minutes per session: Not on file    Stress: Not on file   Relationships    Social connections:     Talks on phone: Not on file     Gets together: Not on file     Attends Islam service: Not on file     Active member of club or organization: Not on file     Attends meetings of clubs or organizations: Not on file     Relationship status: Not on file    Intimate partner violence:     Fear of current or ex partner: Not on file     Emotionally abused: Not on file     Physically abused: Not on file     Forced sexual activity: Not on file   Other Topics Concern    Not on file   Social History Narrative    Not on file       Family History:   No family history on file. Physical Examination:  /80   Pulse 80   Ht 5' 7\" (1.702 m)   Wt 281 lb (127.5 kg)   BMI 44.01 kg/m²   Physical Exam  Constitutional:       General: She is not in acute distress. Appearance: She is obese. She is not diaphoretic. Comments: Morbid obesity. Blood pressure right arm sitting 120/70 mmHg, pulse 68 bpm regular   HENT:      Mouth/Throat:      Pharynx: No oropharyngeal exudate. Eyes:      General: No scleral icterus. Right eye: No discharge. Left eye: No discharge. Neck:      Thyroid: No thyromegaly. Vascular: No JVD. Cardiovascular:      Rate and Rhythm: Normal rate and regular rhythm. No extrasystoles are present. Heart sounds: Normal heart sounds, S1 normal and S2 normal. No murmur. No systolic murmur. No diastolic murmur.  No friction

## 2020-01-28 PROBLEM — I10 ESSENTIAL HYPERTENSION: Status: ACTIVE | Noted: 2020-01-28

## 2020-01-28 PROBLEM — J98.11 MILD BIBASILAR ATELECTASIS: Status: ACTIVE | Noted: 2020-01-28

## 2020-01-28 PROBLEM — N18.30 CKD (CHRONIC KIDNEY DISEASE) STAGE 3, GFR 30-59 ML/MIN (HCC): Status: ACTIVE | Noted: 2020-01-28

## 2020-01-28 PROBLEM — I25.810 CORONARY ARTERY DISEASE INVOLVING AUTOLOGOUS VEIN CORONARY BYPASS GRAFT WITHOUT ANGINA PECTORIS: Status: ACTIVE | Noted: 2020-01-28

## 2020-01-28 PROBLEM — E11.9 TYPE 2 DIABETES MELLITUS, WITHOUT LONG-TERM CURRENT USE OF INSULIN (HCC): Status: ACTIVE | Noted: 2020-01-28

## 2020-01-28 PROBLEM — E66.01 CLASS 3 SEVERE OBESITY DUE TO EXCESS CALORIES WITH SERIOUS COMORBIDITY AND BODY MASS INDEX (BMI) OF 45.0 TO 49.9 IN ADULT (HCC): Status: ACTIVE | Noted: 2020-01-28

## 2020-01-28 PROBLEM — Z87.891 PERSONAL HISTORY OF NICOTINE DEPENDENCE: Status: ACTIVE | Noted: 2020-01-28

## 2020-01-28 PROBLEM — I51.89 DIASTOLIC DYSFUNCTION: Status: ACTIVE | Noted: 2020-01-28

## 2020-02-06 ENCOUNTER — OFFICE VISIT (OUTPATIENT)
Dept: PULMONOLOGY | Facility: CLINIC | Age: 70
End: 2020-02-06

## 2020-02-06 ENCOUNTER — TELEPHONE (OUTPATIENT)
Dept: PULMONOLOGY | Facility: CLINIC | Age: 70
End: 2020-02-06

## 2020-02-06 VITALS
OXYGEN SATURATION: 95 % | WEIGHT: 273 LBS | HEIGHT: 63 IN | BODY MASS INDEX: 48.37 KG/M2 | SYSTOLIC BLOOD PRESSURE: 128 MMHG | DIASTOLIC BLOOD PRESSURE: 70 MMHG | HEART RATE: 81 BPM

## 2020-02-06 DIAGNOSIS — Z87.891 PERSONAL HISTORY OF NICOTINE DEPENDENCE: ICD-10-CM

## 2020-02-06 DIAGNOSIS — J98.4 RESTRICTIVE LUNG DISEASE: ICD-10-CM

## 2020-02-06 DIAGNOSIS — N18.30 CKD (CHRONIC KIDNEY DISEASE) STAGE 3, GFR 30-59 ML/MIN (HCC): ICD-10-CM

## 2020-02-06 DIAGNOSIS — J98.11 MILD BIBASILAR ATELECTASIS: ICD-10-CM

## 2020-02-06 DIAGNOSIS — R05.9 COUGH: Primary | ICD-10-CM

## 2020-02-06 DIAGNOSIS — E66.01 CLASS 3 SEVERE OBESITY DUE TO EXCESS CALORIES WITH SERIOUS COMORBIDITY AND BODY MASS INDEX (BMI) OF 45.0 TO 49.9 IN ADULT (HCC): ICD-10-CM

## 2020-02-06 DIAGNOSIS — N18.30 TYPE 2 DIABETES MELLITUS WITH STAGE 3 CHRONIC KIDNEY DISEASE, WITHOUT LONG-TERM CURRENT USE OF INSULIN (HCC): ICD-10-CM

## 2020-02-06 DIAGNOSIS — E11.22 TYPE 2 DIABETES MELLITUS WITH STAGE 3 CHRONIC KIDNEY DISEASE, WITHOUT LONG-TERM CURRENT USE OF INSULIN (HCC): ICD-10-CM

## 2020-02-06 DIAGNOSIS — I25.810 CORONARY ARTERY DISEASE INVOLVING AUTOLOGOUS VEIN CORONARY BYPASS GRAFT WITHOUT ANGINA PECTORIS: ICD-10-CM

## 2020-02-06 PROCEDURE — 94010 BREATHING CAPACITY TEST: CPT | Performed by: NURSE PRACTITIONER

## 2020-02-06 PROCEDURE — 99214 OFFICE O/P EST MOD 30 MIN: CPT | Performed by: NURSE PRACTITIONER

## 2020-02-06 PROCEDURE — 94727 GAS DIL/WSHOT DETER LNG VOL: CPT | Performed by: NURSE PRACTITIONER

## 2020-02-06 PROCEDURE — 94729 DIFFUSING CAPACITY: CPT | Performed by: NURSE PRACTITIONER

## 2020-02-06 RX ORDER — FUROSEMIDE 40 MG/1
TABLET ORAL
COMMUNITY
Start: 2019-02-21

## 2020-02-06 RX ORDER — MONTELUKAST SODIUM 10 MG/1
TABLET ORAL
COMMUNITY

## 2020-02-06 RX ORDER — CHLORAL HYDRATE 500 MG
1000 CAPSULE ORAL
COMMUNITY

## 2020-02-06 RX ORDER — ACETAMINOPHEN, ASPIRIN AND CAFFEINE 250; 250; 65 MG/1; MG/1; MG/1
1 TABLET, FILM COATED ORAL
COMMUNITY

## 2020-02-06 RX ORDER — EZETIMIBE 10 MG/1
TABLET ORAL
COMMUNITY

## 2020-02-06 RX ORDER — GLIMEPIRIDE 1 MG/1
1 TABLET ORAL DAILY
COMMUNITY
Start: 2019-08-11

## 2020-02-06 RX ORDER — PNV NO.95/FERROUS FUM/FOLIC AC 28MG-0.8MG
1 TABLET ORAL DAILY
COMMUNITY
Start: 2019-12-10

## 2020-02-06 RX ORDER — RAMIPRIL 10 MG/1
CAPSULE ORAL EVERY 12 HOURS SCHEDULED
COMMUNITY

## 2020-02-06 RX ORDER — TRETINOIN 0.1 MG/G
GEL TOPICAL NIGHTLY
COMMUNITY

## 2020-02-06 RX ORDER — GEMFIBROZIL 600 MG/1
600 TABLET, FILM COATED ORAL
COMMUNITY

## 2020-02-06 RX ORDER — MULTIVITAMIN WITH IRON
250 TABLET ORAL
COMMUNITY

## 2020-02-06 RX ORDER — ATENOLOL 25 MG/1
TABLET ORAL
COMMUNITY

## 2020-02-06 RX ORDER — ACETAMINOPHEN 500 MG
500 TABLET ORAL
COMMUNITY

## 2020-02-06 NOTE — TELEPHONE ENCOUNTER
We discussed it for several minutes.  She does not have COPD.  PFTs were normal other than mild hyperinflation. Nothing to do for this other than take the Xopenex when needed which is the only inhaled medications that she stated helps

## 2020-02-06 NOTE — PATIENT INSTRUCTIONS

## 2020-02-06 NOTE — PROCEDURES
Pulmonary Function Test  Performed by: Susan Ramirez APRN  Authorized by: Susan Ramirez APRN      Pre Drug    FVC: 83%   FEV1: 85%   FEF 25-75%: 86%   FEV1/FVC: 80.6%   T%   RV: 104%   DLCO: 71%   D/VAsb: 90%

## 2020-02-10 ENCOUNTER — TELEPHONE (OUTPATIENT)
Dept: PULMONOLOGY | Facility: CLINIC | Age: 70
End: 2020-02-10

## 2020-02-10 NOTE — TELEPHONE ENCOUNTER
Pt called and wanted you to know that she will be getting her LDCT done at [facility] and asked if you will call her with those results.  She also wanted you to know that Dr. Guzmán did check her heart completely.  Still trying to get disc of CXR done by previous pulmonologist and will bring it to her next OV.

## 2020-02-25 ENCOUNTER — HOSPITAL ENCOUNTER (OUTPATIENT)
Dept: CT IMAGING | Age: 70
Discharge: HOME | End: 2020-02-25
Payer: MEDICARE

## 2020-02-25 DIAGNOSIS — Z87.891 PERSONAL HISTORY OF NICOTINE DEPENDENCE: ICD-10-CM

## 2020-02-25 PROCEDURE — G0297 LDCT FOR LUNG CA SCREEN: HCPCS

## 2020-02-26 ENCOUNTER — TELEPHONE (OUTPATIENT)
Dept: PULMONOLOGY | Facility: CLINIC | Age: 70
End: 2020-02-26

## 2020-02-26 DIAGNOSIS — R91.8 ABNORMAL CT SCAN OF LUNG: ICD-10-CM

## 2020-02-26 DIAGNOSIS — R06.02 SHORTNESS OF BREATH: Primary | ICD-10-CM

## 2020-02-26 NOTE — TELEPHONE ENCOUNTER
----- Message from CONY Monreal sent at 2/25/2020  2:52 PM CST -----  Please let her know that the low-dose imaging identified a questionable nodule in the left lung.  The radiologist is recommending a more definitive high-resolution CT scan of the lungs for better confirmation.  If she is agreeable we will make arrangements for this at [facility].

## 2020-02-26 NOTE — TELEPHONE ENCOUNTER
Patient notified of LDCT results. She is agreeable to the HRCT of the chest. Please order, thanks.

## 2020-03-04 ENCOUNTER — HOSPITAL ENCOUNTER (OUTPATIENT)
Dept: CT IMAGING | Age: 70
Discharge: HOME | End: 2020-03-04
Payer: MEDICARE

## 2020-03-04 ENCOUNTER — TELEPHONE (OUTPATIENT)
Dept: PULMONOLOGY | Facility: CLINIC | Age: 70
End: 2020-03-04

## 2020-03-04 DIAGNOSIS — R06.02 SHORTNESS OF BREATH: ICD-10-CM

## 2020-03-04 DIAGNOSIS — R91.8 ABNORMAL CT SCAN OF LUNG: ICD-10-CM

## 2020-03-04 PROCEDURE — 71250 CT THORAX DX C-: CPT

## 2020-03-04 NOTE — TELEPHONE ENCOUNTER
Patient notified of results. She would like to make an appointment to come in and discuss with Susan.

## 2020-03-04 NOTE — TELEPHONE ENCOUNTER
----- Message from CONY Monreal sent at 3/4/2020  3:37 PM CST -----  Please let her know I reviewed her new CT scan.  She does not have a lung nodule like they originally thought.  She does however have some scarring I would like to discuss with her in more detail.  There is also some blood work I am going to want her to do which we can discuss at the office visit as well.  After you talk to her please have her talk with the scheduling girls to make her an appointment.  It does not have to be urgent just the next available.

## 2020-03-05 ENCOUNTER — TELEPHONE (OUTPATIENT)
Dept: PULMONOLOGY | Facility: CLINIC | Age: 70
End: 2020-03-05

## 2020-03-05 DIAGNOSIS — R06.02 SHORTNESS OF BREATH: ICD-10-CM

## 2020-03-05 DIAGNOSIS — R53.83 OTHER FATIGUE: Primary | ICD-10-CM

## 2020-03-05 NOTE — TELEPHONE ENCOUNTER
called back stating that the PCP office ([facility]) is supposed to be calling us today to find out more information. Ms Moser is going to their office tomorrow. Mr Moser said that they would like to get the blood work done at the [facility]. He also states that he will be getting a 2nd opinion. He said they werent going to make an appointment for here until he talks with the PCP and gets blood work scheduled

## 2020-03-05 NOTE — TELEPHONE ENCOUNTER
I called patient and she is wanting to know what type of blood work before she will schedule an appointment.

## 2020-03-05 NOTE — TELEPHONE ENCOUNTER
I have spoken with the patient's PCP and explained to her which labs I want to obtain and why.  CT scan of the chest high resolution showed no nodules however it showed a reticular and bronchiectatic pattern basilarly consistent with UIP.  Will complete autoimmune labs to rule out underlying autoimmune process and labs for hypersensitivity pneumonitis.  If these are negative she should be considered a potential IPF patient as she has no occupational risk factors.  Orders faxed over to Bela at [facility] for ERNESTO, rheumatoid factor, sed rate, CRP, anti-CCP and HP panel.  I have asked them to forward these results to me when they become available.    I have spoken with the patient as well as her  to ensure that they understand the importance of obtaining the labs.  Also explained that I do not mind him getting a second opinion and encouraged him not to let this go by the wayside is either of these diagnoses will continue to progress without intervention.  The  and patient both indicate they understand this fully.  They have a daughter who is a surgical nurse in [place] and a son who is an .  They would like to go over all of this information with both of them before deciding how to proceed.  They indicate they will call our office after lab work results are back to close the loop on whether or not they will return for follow-up or will seek treatment elsewhere.    Nothing else to do at this time other than await patient's phone call

## 2020-03-05 NOTE — TELEPHONE ENCOUNTER
The labs that I would be ordering would be to rule out autoimmune diseases like lupus and rheumatoid arthritis that can sometimes cause changes in the lungs like I saw on her CT scan.  None of these would be done on routine blood work.  The reason I will be ordering the labs and the CT scan results is what I want to discuss with her at the appointment which is why I had Guilherme call and ask her to move her appointment up.  Too detailed of a conversation to have over the phone.  She needs an appointment.  Make sure she has 1.

## 2020-03-05 NOTE — TELEPHONE ENCOUNTER
Patient is wanting to specially what kind of blood work you are going to order for her to get done. She knows she has more blood work coming up and doesn't want to duplicate anything. She said she wants us to fax her dr to let them know too

## 2020-03-09 ENCOUNTER — TELEPHONE (OUTPATIENT)
Dept: PULMONOLOGY | Facility: CLINIC | Age: 70
End: 2020-03-09

## 2020-03-09 NOTE — TELEPHONE ENCOUNTER
Please review multiple telephone encounters on this patient since that date.  We have talked on the phone several times.  She would like to seek a second opinion elsewhere.  I am awaiting autoimmune lab results to come back to me from her primary care provider.

## 2020-03-12 ENCOUNTER — TELEPHONE (OUTPATIENT)
Dept: PULMONOLOGY | Facility: CLINIC | Age: 70
End: 2020-03-12

## 2020-03-12 NOTE — TELEPHONE ENCOUNTER
"They picked up their bloodwork and received them yesterday, He wanted to know if we have heard anything from them. I see where they were ordered but don't see where they are back.  I have tried calling all three numbers and all it says \"call cannot be completed at this time\".  Have you seen the bloodwork come back?  "

## 2020-03-13 NOTE — TELEPHONE ENCOUNTER
Printed labs off my email since the fax won't go through.  Talked with Esther.   Gave to eric to scan into her chart.

## 2020-03-19 ENCOUNTER — RESULTS ENCOUNTER (OUTPATIENT)
Dept: PULMONOLOGY | Facility: CLINIC | Age: 70
End: 2020-03-19

## 2020-03-19 DIAGNOSIS — R06.02 SHORTNESS OF BREATH: ICD-10-CM

## 2020-03-19 DIAGNOSIS — R53.83 OTHER FATIGUE: ICD-10-CM

## 2020-07-28 ENCOUNTER — OFFICE VISIT (OUTPATIENT)
Dept: CARDIOLOGY | Age: 70
End: 2020-07-28
Payer: MEDICARE

## 2020-07-28 VITALS
HEART RATE: 67 BPM | DIASTOLIC BLOOD PRESSURE: 74 MMHG | SYSTOLIC BLOOD PRESSURE: 130 MMHG | OXYGEN SATURATION: 100 % | BODY MASS INDEX: 42.11 KG/M2 | HEIGHT: 66 IN | WEIGHT: 262 LBS

## 2020-07-28 PROBLEM — R07.9 CHEST PAIN: Status: ACTIVE | Noted: 2020-07-28

## 2020-07-28 PROCEDURE — G8400 PT W/DXA NO RESULTS DOC: HCPCS | Performed by: NURSE PRACTITIONER

## 2020-07-28 PROCEDURE — 93000 ELECTROCARDIOGRAM COMPLETE: CPT | Performed by: NURSE PRACTITIONER

## 2020-07-28 PROCEDURE — 4040F PNEUMOC VAC/ADMIN/RCVD: CPT | Performed by: NURSE PRACTITIONER

## 2020-07-28 PROCEDURE — G8417 CALC BMI ABV UP PARAM F/U: HCPCS | Performed by: NURSE PRACTITIONER

## 2020-07-28 PROCEDURE — 1090F PRES/ABSN URINE INCON ASSESS: CPT | Performed by: NURSE PRACTITIONER

## 2020-07-28 PROCEDURE — 1123F ACP DISCUSS/DSCN MKR DOCD: CPT | Performed by: NURSE PRACTITIONER

## 2020-07-28 PROCEDURE — 3017F COLORECTAL CA SCREEN DOC REV: CPT | Performed by: NURSE PRACTITIONER

## 2020-07-28 PROCEDURE — 99214 OFFICE O/P EST MOD 30 MIN: CPT | Performed by: NURSE PRACTITIONER

## 2020-07-28 PROCEDURE — 1036F TOBACCO NON-USER: CPT | Performed by: NURSE PRACTITIONER

## 2020-07-28 PROCEDURE — G8427 DOCREV CUR MEDS BY ELIG CLIN: HCPCS | Performed by: NURSE PRACTITIONER

## 2020-07-28 RX ORDER — CALCIUM CARBONATE/VITAMIN D3 600 MG-10
TABLET ORAL DAILY
COMMUNITY
End: 2021-02-26 | Stop reason: ALTCHOICE

## 2020-07-28 RX ORDER — CHOLECALCIFEROL (VITAMIN D3) 1250 MCG
CAPSULE ORAL
COMMUNITY
End: 2021-02-26 | Stop reason: ALTCHOICE

## 2020-07-28 RX ORDER — MULTIVITAMIN/IRON/FOLIC ACID 18MG-0.4MG
250 TABLET ORAL DAILY
COMMUNITY

## 2020-07-28 NOTE — PATIENT INSTRUCTIONS
New instructions for today:  Weigh yourself daily without clothing at the same time each day. Record a daily weight log. Call the office if you gain 3 pounds or more in 2 to 3 days or 5 pounds in one week. A sudden weight gain may mean that your heart failure is getting worse. Sodium causes your body to hold on to extra water. This may cause your heart failure symptoms to get worse. Limit sodium to 2,000 milligrams (mg) a day or less. That is less than 1 teaspoon of salt a day, including all the salt you eat in cooking or in packaged foods. Fluid restriction of 1500ml per day (about 6 cups of fluid per day). Patient Instructions:  Continue current medications as prescribed. Always keep a current medication list. Bring your medications to every office visit. Continue to follow up with primary care provider for non cardiac medical problems. Call the office with any problems, questions or concerns at [phone]. If you have been asked to keep a blood pressure log, do so for 2 weeks. Call the office to report readings to the triage nurse at [phone]. Follow up with cardiologist as scheduled. The following educational material has been included in this after visit summary for your review: Life simple 7. Heart health. Avoid heart failure triggers. Life simple 7  1) Manage blood pressure - high blood pressure is a major risk factor for heart disease and stroke. Keeping blood pressure in health range reduces strain on your heart, arteries and kidneys. Blood pressure goal is less than 130/80. 2) Control cholesterol - contributes to plaque, which can clog arteries and lead to heart disease and stroke. When you control your cholesterol you are giving your arteries their best chance to remain clear. It is recommended that you get cholesterol lab work done once a year. 3) Reduce blood sugar - most of the food we eat is turning into glucose or blood sugar that our body uses for energy. your test results and keep a list of the medicines you take. How can you care for yourself at home? Diet  · Use less salt when you cook and eat. This helps lower your blood pressure. Taste food before salting. Add only a little salt when you think you need it. With time, your taste buds will adjust to less salt. · Eat fewer snack items, fast foods, canned soups, and other high-salt, high-fat, processed foods. · Read food labels and try to avoid saturated and trans fats. They increase your risk of heart disease by raising cholesterol levels. · Limit the amount of solid fat-butter, margarine, and shortening-you eat. Use olive, peanut, or canola oil when you cook. Bake, broil, and steam foods instead of frying them. · Eat a variety of fruit and vegetables every day. Dark green, deep orange, red, or yellow fruits and vegetables are especially good for you. Examples include spinach, carrots, peaches, and berries. · Foods high in fiber can reduce your cholesterol and provide important vitamins and minerals. High-fiber foods include whole-grain cereals and breads, oatmeal, beans, brown rice, citrus fruits, and apples. · Eat lean proteins. Heart-healthy proteins include seafood, lean meats and poultry, eggs, beans, peas, nuts, seeds, and soy products. · Limit drinks and foods with added sugar. These include candy, desserts, and soda pop. Lifestyle changes  · If your doctor recommends it, get more exercise. Walking is a good choice. Bit by bit, increase the amount you walk every day. Try for at least 30 minutes on most days of the week. You also may want to swim, bike, or do other activities. · Do not smoke. If you need help quitting, talk to your doctor about stop-smoking programs and medicines. These can increase your chances of quitting for good. Quitting smoking may be the most important step you can take to protect your heart. It is never too late to quit.   · Limit alcohol to 2 drinks a day for men and 1 drink a day for women. Too much alcohol can cause health problems. · Manage other health problems such as diabetes, high blood pressure, and high cholesterol. If you think you may have a problem with alcohol or drug use, talk to your doctor. Medicines  · Take your medicines exactly as prescribed. Call your doctor if you think you are having a problem with your medicine. · If your doctor recommends aspirin, take the amount directed each day. Make sure you take aspirin and not another kind of pain reliever, such as acetaminophen (Tylenol). When should you call for help? [id number] if you have symptoms of a heart attack. These may include:  · Chest pain or pressure, or a strange feeling in the chest.  · Sweating. · Shortness of breath. · Pain, pressure, or a strange feeling in the back, neck, jaw, or upper belly or in one or both shoulders or arms. · Lightheadedness or sudden weakness. · A fast or irregular heartbeat. After you call 911, the  may tell you to chew 1 adult-strength or 2 to 4 low-dose aspirin. Wait for an ambulance. Do not try to drive yourself. Watch closely for changes in your health, and be sure to contact your doctor if you have any problems. Where can you learn more? Go to https://NeoAccel.GetAFive. org and sign in to your wedgies account. Enter D159 in the [facility] box to learn more about \"A Healthy Heart: Care Instructions. \"     If you do not have an account, please click on the \"Sign Up Now\" link. Current as of: December 16, 2019               Content Version: 12.5  © 4617-4907 Healthwise, Incorporated. Care instructions adapted under license by [facility] [employer] [facility] ([facility]). If you have questions about a medical condition or this instruction, always ask your healthcare professional. [place] [address] any warranty or liability for your use of this information.         Patient Education        Avoiding Triggers With Heart Failure: Care Instructions  Your Care Instructions     Triggers are anything that make your heart failure flare up. A flare-up is also called \"sudden heart failure\" or \"acute heart failure. \" When you have a flare-up, fluid builds up in your lungs, and you have problems breathing. You might need to go to the hospital. By watching for changes in your condition and avoiding triggers, you can prevent heart failure flare-ups. Follow-up care is a key part of your treatment and safety. Be sure to make and go to all appointments, and call your doctor if you are having problems. It's also a good idea to know your test results and keep a list of the medicines you take. How can you care for yourself at home? Watch for changes in your weight and condition  · Weigh yourself without clothing at the same time each day. Record your weight. Call your doctor if you have sudden weight gain, such as more than 2 to 3 pounds in a day or 5 pounds in a week. (Your doctor may suggest a different range of weight gain.) A sudden weight gain may mean that your heart failure is getting worse. · Keep a daily record of your symptoms. Write down any changes in how you feel, such as new shortness of breath, cough, or problems eating. Also record if your ankles are more swollen than usual and if you feel more tired than usual. Note anything that you ate or did that could have triggered these changes. Limit sodium  Sodium causes your body to hold on to extra water. This may cause your heart failure symptoms to get worse. People get most of their sodium from processed foods. Fast food and restaurant meals also tend to be very high in sodium. · Your doctor may suggest that you limit sodium. Your doctor can tell you how much sodium is right for you. This includes limiting sodium in cooked and packaged foods. · Read food labels on cans and food packages. They tell you how much sodium you get in one serving. Check the serving size.  If you eat more than one serving, you are getting more sodium. · Be aware that sodium can come in forms other than salt, including monosodium glutamate (MSG), sodium citrate, and sodium bicarbonate (baking soda). MSG is often added to Asian food. You can sometimes ask for food without MSG or salt. · Slowly reducing salt will help you adjust to the taste. Take the salt shaker off the table. · Flavor your food with garlic, lemon juice, onion, vinegar, herbs, and spices instead of salt. Do not use soy sauce, steak sauce, onion salt, garlic salt, mustard, or ketchup on your food, unless it is labeled \"low-sodium\" or \"low-salt. \"  · Make your own salad dressings, sauces, and ketchup without adding salt. · Use fresh or frozen ingredients, instead of canned ones, whenever you can. Choose low-sodium canned goods. · Eat less processed food and food from restaurants, including fast food. Exercise as directed  Moderate, regular exercise is very good for your heart. It improves your blood flow and helps control your weight. But too much exercise can stress your heart and cause a heart failure flare-up. · Check with your doctor before you start an exercise program.  · Walking is an easy way to get exercise. Start out slowly. Gradually increase the length and pace of your walk. Swimming, riding a bike, and using a treadmill are also good forms of exercise. · When you exercise, watch for signs that your heart is working too hard. You are pushing yourself too hard if you cannot talk while you are exercising. If you become short of breath or dizzy or have chest pain, stop, sit down, and rest.  · Do not exercise when you do not feel well. Take medicines correctly  · Take your medicines exactly as prescribed. Call your doctor if you think you are having a problem with your medicine. · Make a list of all the medicines you take. Include those prescribed to you by other doctors and any over-the-counter medicines, vitamins, or supplements you take.  Take this list with you when you go to any doctor. · Take your medicines at the same time every day. It may help you to post a list of all the medicines you take every day and what time of day you take them. · Make taking your medicine as simple as you can. Plan times to take your medicines when you are doing other things, such as eating a meal or getting ready for bed. This will make it easier to remember to take your medicines. · Get organized. Use helpful tools, such as daily or weekly pill containers. When should you call for help? Call 911 if you have symptoms of sudden heart failure such as:  · You have severe trouble breathing. · You cough up pink, foamy mucus. · You have a new irregular or rapid heartbeat. Call your doctor now or seek immediate medical care if:  · You have new or increased shortness of breath. · You are dizzy or lightheaded, or you feel like you may faint. · You have sudden weight gain, such as more than 2 to 3 pounds in a day or 5 pounds in a week. (Your doctor may suggest a different range of weight gain.)  · You have increased swelling in your legs, ankles, or feet. · You are suddenly so tired or weak that you cannot do your usual activities. Watch closely for changes in your health, and be sure to contact your doctor if you develop new symptoms. Where can you learn more? Go to https://L[employer] Media GrouppeFileString.Greenphire. org and sign in to your [employer] account. Enter B475 in the [facility] box to learn more about \"Avoiding Triggers With Heart Failure: Care Instructions. \"     If you do not have an account, please click on the \"Sign Up Now\" link. Current as of: December 16, 2019               Content Version: 12.5  © 9683-6997 Healthwise, Incorporated. Care instructions adapted under license by [facility] ([facility]).  If you have questions about a medical condition or this instruction, always ask your healthcare professional. Jaquelin Smith disclaims any warranty or liability for your use of this information.

## 2020-07-28 NOTE — PROGRESS NOTES
[facility], [place]. [address] [facility]  [phone] office  [phone] fax      OFFICE VISIT:  2020    Modesta Henriquez - : 1950  Reason For Visit:  Anthony Quinones is a 71 y.o. female who is here for 6 Month Follow-Up (pain under left breast,SOB); Coronary Artery Disease; and Hypertension    History:   Diagnosis Orders   1. Coronary artery disease involving native coronary artery of native heart, angina presence unspecified     2. Essential hypertension     3. Mixed hyperlipidemia     4. Hx of chronic kidney disease     5. Diastolic heart failure, unspecified HF chronicity ([facility] [address]       The patient presents today for cardiology follow up. The patient is doing very well with no cardiac related issues. Patient reports cannot take Vascepa due to gout. She states \"I have fatty tumors in my belly, they are lipomas. \"  No issues with fluid retention. She is trying to eat healthier and has lost 20 pounds. The patient also stopped smoking. The patient denies symptoms to suggest myocardial ischemia, heart failure exacerbation or arrhythmia. BP is well controlled on current regimen. The patient's PCP monitors cholesterol and DM. HgA1c 10.5 down to 9.7. Cholesterol little high per patient report. She will sign release to obtain recent labs done by PCP. Continues on oxygen therapy at night. Tressia Morning denies exertional chest pain, orthopnea, paroxysmal nocturnal dyspnea, syncope, presyncope, sensed arrhythmia, edema and fatigue. The patient denies numbness or weakness to suggest cerebrovascular accident or transient ischemic attack. + AUGUSTINE stable.     Modesta Henriquez has the following history as recorded in [facility]:  Patient Active Problem List   Diagnosis Code    Essential hypertension B91    Diastolic heart failure (HCC) I50.30    Mixed hyperlipidemia E78.2    Hx of chronic kidney disease Z87.448    Class 3 severe obesity due to excess calories with serious comorbidity and body mass index (BMI) of 45.0 to 49.9 in adult (HCC) E66.01, Z68.42    SOB (shortness of breath) R06.02    Family history of coronary artery disease Z82.49    Abnormal nuclear stress test R94.39    Coronary artery disease involving native coronary artery of native heart I25.10    Chest pain R07.9     Past Medical History:   Diagnosis Date    Arthritis     Chronic congestive heart failure with left ventricular diastolic dysfunction ([facility] [address]     [facility] exacerbation [facility]     Coronary artery disease involving native coronary artery     Dyspnea     Essential hypertension     Gout     Hyperlipidemia     Hypomagnesemia     Osteoporosis     Type 2 diabetes mellitus ([facility] [address]      Past Surgical History:   Procedure Laterality Date    COLONOSCOPY      CYST REMOVAL      TONSILLECTOMY       History reviewed. No pertinent family history.   Social History     Tobacco Use    Smoking status: Former Smoker     Packs/day: 1.00     Years: 50.00     Pack years: 50.00     Types: Cigarettes     Last attempt to quit: 2018     Years since quittin.4    Smokeless tobacco: Never Used   Substance Use Topics    Alcohol use: Yes     Frequency: Never     Comment: occassionally      Current Outpatient Medications   Medication Sig Dispense Refill    Cholecalciferol (VITAMIN D3) 1.25 MG (00155 UT) CAPS Take by mouth Indications: three times a week      magnesium oxide (MAG-OX) 400 MG tablet Take 400 mg by mouth daily      Omega 3 1200 MG CAPS Take by mouth daily      Insulin Aspart Prot & Aspart (NOVOLOG MIX 70/30 FLEXPEN SC) Inject into the skin 3 times daily Indications: 25 units in am,25 units lunch, 30 units at supper      Exenatide (BYDUREON SC) Inject into the skin once a week      ibuprofen (ADVIL;MOTRIN) 200 MG tablet Take 200 mg by mouth every 6 hours as needed for Pain      OXYGEN Inhale into the lungs      acetaminophen (TYLENOL) 500 MG tablet Take 500 mg by mouth every 6 hours as needed for Pain      aspirin-acetaminophen-caffeine (EXCEDRIN MIGRAINE) 250-250-65 MG per tablet Take 1 tablet by mouth every 6 hours as needed for Headaches      gemfibrozil (LOPID) 600 MG tablet Take 600 mg by mouth 2 times daily (before meals)      Linaclotide (LINZESS) 72 MCG CAPS Take 72 mcg by mouth daily      nystatin-triamcinolone (MYCOLOG II) 346602-5.1 UNIT/GM-% cream Apply topically as needed       furosemide (LASIX) 40 MG tablet Take 40 mg by mouth 2 times daily      Probiotic Product (PROBIOTIC ADVANCED PO) Take 1 tablet by mouth 2 times daily       levalbuterol (XOPENEX) 1.25 MG/3ML nebulizer solution Inhale 1 ampule into the lungs every 4 hours as needed       CANNABIDIOL PO Take by mouth daily       albuterol (PROVENTIL) 2 MG tablet Take 2 mg by mouth 2 times daily       aspirin 81 MG tablet Take 81 mg by mouth daily       atenolol (TENORMIN) 25 MG tablet Take 25 mg by mouth daily      ezetimibe (ZETIA) 10 MG tablet Take 10 mg by mouth daily      montelukast (SINGULAIR) 10 MG tablet Take 10 mg by mouth nightly      Prenatal Vit-Fe Fumarate-FA (PX PRENATAL MULTIVITAMINS PO) Take 1 tablet by mouth daily       folic acid (FOLVITE) 1 MG tablet Take 1 mg by mouth daily      Icosapent Ethyl (VASCEPA) 1 g CAPS capsule Take 2 capsules by mouth daily      Omega-3 Fatty Acids (FISH OIL) 1000 MG CAPS Take 1,000 mg by mouth daily      glimepiride (AMARYL) 2 MG tablet Take 2 mg by mouth 2 times daily      furosemide (LASIX) 40 MG tablet Take 40 mg by mouth 2 times daily      magnesium (MAGNESIUM-OXIDE) 250 MG TABS tablet Take 250 mg by mouth daily      Cholecalciferol (VITAMIN D3) 47348 units CAPS Take 50,000 Units by mouth Twice a Week       Potassium 99 MG TABS Take 99 mg by mouth daily      ramipril (ALTACE) 10 MG capsule Take 10 mg by mouth 2 times daily       No current facility-administered medications for this visit. Allergies: Grapefruit concentrate; Tetanus immune globulin; Trulicity [dulaglutide]; and Zithromax [azithromycin]    Review of Systems  Constitutional - no appetite change, or unexpected weight change. No fever, chills or diaphoresis. No significant change in activity level or new onset of fatigue. HEENT - no significant rhinorrhea or epistaxis. No tinnitus or significant hearing loss. Eyes - no sudden vision change or amaurosis. No corneal arcus, xantholasma, subconjunctival hemorrhage or discharge. Respiratory - no significant wheezing, stridor, apnea or cough.  + AUGUSTINE stable. Cardiovascular - no exertional chest pain to suggest myocardial ischemia. No orthopnea or PND. No sensation of sustained arrythmia. No occurrence of slow heart rate. No palpitations. No claudication. Gastrointestinal - no abdominal swelling or pain. No blood in stool. No severe constipation, diarrhea, nausea, or vomiting. Genitourinary - no dysuria, frequency, or urgency. No flank pain or hematuria. Musculoskeletal - no back pain or myalgia. Transported via wheelchair. Extremities - no clubbing, cyanosis or extremity edema. Skin - no color change or rash. No pallor. No new surgical incision. Neurologic - no speech difficulty, facial asymmetry or lateralizing weakness. No seizures, presyncope or syncope. No significant dizziness. Hematologic - no easy bruising or excessive bleeding. Psychiatric - no severe anxiety or insomnia. No confusion. All other review of systems are negative. Objective  Vital Signs - /74   Pulse 67   Ht 5' 6\" (1.676 m)   Wt 262 lb (118.8 kg)   SpO2 100%   BMI 42.29 kg/m²   General - [facility] is alert, cooperative, and pleasant. Well groomed. No acute distress. Body habitus - Body mass index is 42.29 kg/m². HEENT - Head is normocephalic. No circumoral cyanosis. Dentition is normal.  EYES -   Lids normal without ptosis.   No discharge, edema or subconjunctival hemorrhage. Neck - Symmetrical without apparent mass or lymphadenopathy. Respiratory - Normal respiratory effort without use of accessory muscles. Ausculatation reveals vesicular breath sounds without crackles, wheezes, rub or rhonchi. Cardiovascular - No jugular venous distention. Auscultation reveals regular rate and rhythm. No audible clicks, gallop or rub. No murmur. No lower extremity varicosities. No carotid bruits. Abdominal -  No visible distention, mass or pulsations. Extremities - No clubbing or cyanosis. No statis dermatitis or ulcers. No edema. Musculoskeletal -   No Osler's nodes. No kyphosis or scoliosis. Transported via wheelchair. Skin -  Warm and dry; no rash or pallor. No new surgical wound. Neurological - No focal neurological deficits. Thought processes coherent. No apparent tremor. Oriented to person, place and time. Psychiatric -  Appropriate affect and mood. Assessment:     Diagnosis Orders   1. Coronary artery disease involving native coronary artery of native heart, angina presence unspecified     2. Essential hypertension     3. Mixed hyperlipidemia     4. Hx of chronic kidney disease     5. Diastolic heart failure, unspecified HF chronicity ([facility] [address]       Data reviewed:  10/25/19 cath   Angiographic Findings    Cardiac Arteries and Lesion Findings   LMCA: Left Main widely patent.  LAD: LAD mid 35% stenosis. LAD just reaches the apex. 1st diagonal proximal 30% stenosis. Lesion on Mid LAD: 35% stenosis 14 mm length. Lesion on 1st Dia% stenosis 10 mm length. LCx: Circumflex with mild luminal irregularities.  RCA: RCA is a large dominant vessel with moderate luminal irregularities.   LV function assessed as:Normal.    Lab Results   Component Value Date     10/16/2019    K 4.3 10/16/2019     10/16/2019    CO2 27 10/16/2019    BUN 34 (H) 10/16/2019    CREATININE 1.4 (H) 10/16/2019    GLUCOSE 246 (H) 10/16/2019    CALCIUM 9.7 10/16/2019    PROT 8.4 10/16/2019    LABALBU 4.0 10/16/2019    BILITOT 0.3 10/16/2019    ALKPHOS 74 10/16/2019    AST 30 10/16/2019    ALT 20 10/16/2019    LABGLOM 37 (A) 10/16/2019       Lab Results   Component Value Date    CHOL 177 10/16/2019     Lab Results   Component Value Date    TRIG 299 (H) 10/16/2019     Lab Results   Component Value Date    HDL 31 (L) 10/16/2019     Lab Results   Component Value Date    LDLCALC 86 10/16/2019     EKG reviewed:    Sinus  Rhythm 67 bpm; no acute ischemic changes or ectopy. -Old inferior infarct  -Old anterior infarct. Stable CV status without overt heart failure, sensed arrhythmia or angina. CAD - stable on current medical management. Diastolic dysfunction - no symptoms of heart failure. Weight is down 20 pounds with good weight control. Hyperlipidemia - on Zetia. Intolerant to Vascepa due to gout. DM - followed by PCP. HgA1c down from 10.5 to 9.7. Patient is compliant with medication regimen. BP Readings from Last 3 Encounters:   07/28/20 130/74   01/22/20 138/80   10/25/19 114/81    Pulse Readings from Last 3 Encounters:   07/28/20 67   01/22/20 80   10/25/19 86        Wt Readings from Last 3 Encounters:   07/28/20 262 lb (118.8 kg)   01/22/20 281 lb (127.5 kg)   10/25/19 284 lb (128.8 kg)     Plan  Previous cardiac history and records reviewed. Continue current medical management. Continue other current medications as directed. Continue to follow up with primary care provider for non cardiac medical problems. Call the office with any problems, questions or concerns at [phone]. Cardiology follow up: Dr. Magda Harry 6 months. Educational included in patient instructions. Heart health. Avoid heart failure triggers.      KAMLESH Johnson

## 2021-02-26 ENCOUNTER — OFFICE VISIT (OUTPATIENT)
Dept: CARDIOLOGY CLINIC | Age: 71
End: 2021-02-26
Payer: MEDICARE

## 2021-02-26 VITALS
DIASTOLIC BLOOD PRESSURE: 68 MMHG | WEIGHT: 250 LBS | HEIGHT: 66 IN | SYSTOLIC BLOOD PRESSURE: 110 MMHG | BODY MASS INDEX: 40.18 KG/M2 | OXYGEN SATURATION: 98 % | HEART RATE: 96 BPM

## 2021-02-26 DIAGNOSIS — E78.5 HYPERLIPIDEMIA, UNSPECIFIED HYPERLIPIDEMIA TYPE: ICD-10-CM

## 2021-02-26 DIAGNOSIS — I25.10 CORONARY ARTERY DISEASE INVOLVING NATIVE CORONARY ARTERY OF NATIVE HEART WITHOUT ANGINA PECTORIS: Primary | ICD-10-CM

## 2021-02-26 DIAGNOSIS — I10 ESSENTIAL HYPERTENSION: ICD-10-CM

## 2021-02-26 DIAGNOSIS — I50.32 CHRONIC DIASTOLIC HEART FAILURE (HCC): ICD-10-CM

## 2021-02-26 PROCEDURE — 3017F COLORECTAL CA SCREEN DOC REV: CPT | Performed by: NURSE PRACTITIONER

## 2021-02-26 PROCEDURE — 1123F ACP DISCUSS/DSCN MKR DOCD: CPT | Performed by: NURSE PRACTITIONER

## 2021-02-26 PROCEDURE — 1090F PRES/ABSN URINE INCON ASSESS: CPT | Performed by: NURSE PRACTITIONER

## 2021-02-26 PROCEDURE — G8417 CALC BMI ABV UP PARAM F/U: HCPCS | Performed by: NURSE PRACTITIONER

## 2021-02-26 PROCEDURE — 4040F PNEUMOC VAC/ADMIN/RCVD: CPT | Performed by: NURSE PRACTITIONER

## 2021-02-26 PROCEDURE — 1036F TOBACCO NON-USER: CPT | Performed by: NURSE PRACTITIONER

## 2021-02-26 PROCEDURE — 99214 OFFICE O/P EST MOD 30 MIN: CPT | Performed by: NURSE PRACTITIONER

## 2021-02-26 PROCEDURE — G8484 FLU IMMUNIZE NO ADMIN: HCPCS | Performed by: NURSE PRACTITIONER

## 2021-02-26 PROCEDURE — 93000 ELECTROCARDIOGRAM COMPLETE: CPT | Performed by: NURSE PRACTITIONER

## 2021-02-26 PROCEDURE — G8400 PT W/DXA NO RESULTS DOC: HCPCS | Performed by: NURSE PRACTITIONER

## 2021-02-26 PROCEDURE — G8427 DOCREV CUR MEDS BY ELIG CLIN: HCPCS | Performed by: NURSE PRACTITIONER

## 2021-02-26 RX ORDER — ERGOCALCIFEROL 1.25 MG/1
50000 CAPSULE ORAL WEEKLY
COMMUNITY

## 2021-02-26 RX ORDER — METOPROLOL SUCCINATE 25 MG/1
12.5 TABLET, EXTENDED RELEASE ORAL DAILY
Qty: 120 TABLET | Refills: 3 | Status: SHIPPED | OUTPATIENT
Start: 2021-02-26 | End: 2022-03-09

## 2021-02-26 RX ORDER — HUMAN INSULIN 100 [USP'U]/ML
INJECTION, SUSPENSION SUBCUTANEOUS
COMMUNITY

## 2021-02-26 ASSESSMENT — ENCOUNTER SYMPTOMS
CHEST TIGHTNESS: 0
WHEEZING: 0
COUGH: 0
SORE THROAT: 0
SHORTNESS OF BREATH: 0

## 2021-02-26 NOTE — PROGRESS NOTES
[facility] Cardiology   Established Patient Office Visit   [facility]. [address] [facility]  [phone]        OFFICE VISIT:  2021    Dash Martinez - : 1950    Reason For Visit:  Hazel Underwood is a 79 y.o. female who is here for Follow-up (Patient states that she sometimes has a pain on her left side. No other cardiac symptoms, ) and Coronary Artery Disease    1. Coronary artery disease involving native coronary artery of native heart without angina pectoris    2. Essential hypertension    3. Hyperlipidemia, unspecified hyperlipidemia type    4. Chronic diastolic heart failure ([facility] [address]        Patient with a history of coronary artery disease, hypertension, hyperlipidemia, chronic kidney disease stage III and chronic diastolic heart failure. She is a patient of Dr. Vane Jerome. Presents to clinic today as a suggestion from the PCP. Patient's primary care provider stopped her atenolol due to low blood pressures. She was getting a systolic reading in the 11X. On 2021 patient was hospitalized for an upper GI bleed. She was started on Protonix. They stopped aspirin. Patient has noted the last couple days increase in blood pressure to 348-710 systolic. Heart rates 100 110. Patient denies any chest pain, pressure or tightness. There is no shortness of breath, orthopnea or PND. Patient denies any lightheadedness, dizziness or syncope. DATA:    10/25/19 cath   Angiographic Findings    Cardiac Arteries and Lesion Findings   LMCA: Left Main widely patent.  LAD: LAD mid 35% stenosis. LAD just reaches the apex. 1st diagonal proximal 30% stenosis.   Lesion on Mid LAD: 35% stenosis 14 mm length.   Lesion on 1st Dia% stenosis 10 mm length.  LCx: Circumflex with mild luminal irregularities.  RCA: RCA is a large dominant vessel with moderate luminal irregularities.   LV function assessed as:Normal.      Subjective Torsten Barron is a 79 y.o. female with the following history as recorded in [facility]:    Patient Active Problem List    Diagnosis Date Noted    Coronary artery disease involving native coronary artery of native heart      Priority: High    Chest pain 07/28/2020     Priority: Low    Family history of coronary artery disease 10/15/2019     Priority: Low    Abnormal nuclear stress test 10/15/2019     Priority: Low    Diastolic heart failure ([facility] [address] 09/12/2019     Priority: Low    Mixed hyperlipidemia 09/12/2019     Priority: Low    Hx of chronic kidney disease 09/12/2019     Priority: Low    Class 3 severe obesity due to excess calories with serious comorbidity and body mass index (BMI) of 45.0 to 49.9 in adult ([facility] [address] 09/12/2019     Priority: Low    SOB (shortness of breath) 09/12/2019     Priority: Low    Essential hypertension      Priority: Low     Current Outpatient Medications   Medication Sig Dispense Refill    insulin 70-30 (NOVOLIN 70/30) (70-30) 100 UNIT per ML injection vial as directed      vitamin D (ERGOCALCIFEROL) 1.25 MG (75778 UT) CAPS capsule Take 50,000 Units by mouth once a week 3 times a week      metoprolol succinate (TOPROL XL) 25 MG extended release tablet Take 0.5 tablets by mouth daily 120 tablet 3    Magnesium Oxide (MAGNESIUM-OXIDE) 250 MG TABS tablet Take 250 mg by mouth daily       folic acid (FOLVITE) 1 MG tablet Take 1 mg by mouth daily      Exenatide (BYDUREON SC) Inject into the skin once a week      ibuprofen (ADVIL;MOTRIN) 200 MG tablet Take 200 mg by mouth every 6 hours as needed for Pain      OXYGEN Inhale into the lungs      Omega-3 Fatty Acids (FISH OIL PO) Take 2,000 mg by mouth daily       acetaminophen (TYLENOL) 500 MG tablet Take 500 mg by mouth every 6 hours as needed for Pain      aspirin-acetaminophen-caffeine (EXCEDRIN MIGRAINE) 250-250-65 MG per tablet Take 1 tablet by mouth every 6 hours as needed for Headaches  glimepiride (AMARYL) 2 MG tablet Take 2 mg by mouth 2 times daily      furosemide (LASIX) 40 MG tablet Take 40 mg by mouth 2 times daily      gemfibrozil (LOPID) 600 MG tablet Take 600 mg by mouth 2 times daily (before meals)      Linaclotide (LINZESS) 72 MCG CAPS Take 72 mcg by mouth daily      nystatin-triamcinolone (MYCOLOG II) 595224-6.1 UNIT/GM-% cream Apply topically as needed       Probiotic Product (PROBIOTIC ADVANCED PO) Take 1 tablet by mouth 2 times daily       levalbuterol (XOPENEX) 1.25 MG/3ML nebulizer solution Inhale 1 ampule into the lungs every 4 hours as needed       CANNABIDIOL PO Take by mouth daily       albuterol (PROVENTIL) 2 MG tablet Take 2 mg by mouth 2 times daily       ezetimibe (ZETIA) 10 MG tablet Take 10 mg by mouth daily      montelukast (SINGULAIR) 10 MG tablet Take 10 mg by mouth nightly      Prenatal Vit-Fe Fumarate-FA (PX PRENATAL MULTIVITAMINS PO) Take 1 tablet by mouth daily        No current facility-administered medications for this visit. Allergies: Grapefruit concentrate, Tetanus immune globulin, Trulicity [dulaglutide], and Zithromax [azithromycin]  Past Medical History:   Diagnosis Date    Arthritis     Chronic congestive heart failure with left ventricular diastolic dysfunction ([address]     COPD with acute exacerbation (HCC)     Coronary artery disease involving native coronary artery     Dyspnea     Essential hypertension     Gout     Hyperlipidemia     Hypomagnesemia     Osteoporosis     Type 2 diabetes mellitus ([address]      Past Surgical History:   Procedure Laterality Date    COLONOSCOPY      CYST REMOVAL      TONSILLECTOMY       History reviewed. No pertinent family history.   Social History     Tobacco Use    Smoking status: Former Smoker     Packs/day: 1.00     Years: 50.00     Pack years: 50.00     Types: Cigarettes     Quit date: 2/1/2018     Years since quitting: 3.0    Smokeless tobacco: Never Used   Substance Use Topics  Alcohol use: Yes     Frequency: Never     Comment: occassionally          Review of Systems:    Review of Systems   Constitutional: Negative for activity change, appetite change, chills, diaphoresis, fatigue and fever. HENT: Negative for congestion and sore throat. Respiratory: Negative for cough, chest tightness, shortness of breath and wheezing. Cardiovascular: Negative for chest pain, palpitations and leg swelling. Musculoskeletal: Negative. Neurological: Negative for dizziness, syncope, light-headedness and headaches. Psychiatric/Behavioral: Negative for confusion. The patient is not nervous/anxious. Objective:    /68   Pulse 96   Ht 5' 6\" (1.676 m)   Wt 250 lb (113.4 kg)   SpO2 98%   BMI 40.35 kg/m²     GENERAL - well developed and well nourished, conversant  HEENT   PERRLA, Hearing appears normal, gentleman lids are normal.  External inspection of ears and nose appear normal.  NECK - no thyromegaly, no JVD, trachea is in the midline  CARDIOVASCULAR  PMI is in the mid line clavicular position, Normal S1 and S2 with no systolic murmur. No S3 or S4    PULMONARY  no respiratory distress. No wheezes or rales. Lungs are clear to ausculation, normal respiratory effort. ABDOMEN   soft, non tender, no rebound  MUSCULOSKELETAL   range of motion of the upper and lower extermites appears normal and equal and is without pain   EXTREMITIES - no significant edema   NEUROLOGIC  gait and station are normal  SKIN - turgor is normal, can warm and dry.   PSYCHIATRIC - normal mood and affect, alert and orientated x 3,      ASSESSMENT:    ALL THE CARDIOLOGY PROBLEMS ARE LISTED ABOVE; HOWEVER, THE FOLLOWING SPECIFIC CARDIAC PROBLEMS / CONDITIONS WERE ADDRESSED AND TREATED DURING THE OFFICE VISIT TODAY:                                                                                            MEDICAL DECISION MAKING Cardiac Specific Problem / Diagnosis  Discussion and Data Reviewed Diagnostic Procedures Ordered Management Options Selected           1. Tachycardia  Chief complaint   Review and summation of old records:    EKG in the office showing sinus rhythm with a heart rate of 96 bpm.  Will restart beta-blocker at this time Toprol-XL 25 mg half tablet daily. Patient to keep blood pressure and pulse log and bring back on return to visit. Both patient and  verbalized understanding and agreed with plan. Yes Continue current medications:     Yes           2. CAD  Stable   No chest pain Yes Continue current medications:    Yes           3. Hypertension Stable  blood pressure in the office today 110/68. O2 sat 98%. Starting back low-dose beta-blocker 12.5 mg daily. Will need to monitor blood pressure readings. No Continue current medications:       Yes           4. Upper GI bleed Stable  aspirin has been stopped. No Continue current medications:       No     Orders Placed This Encounter   Procedures    EKG 12 lead     Order Specific Question:   Reason for Exam?     Answer:   Hypertension     Orders Placed This Encounter   Medications    metoprolol succinate (TOPROL XL) 25 MG extended release tablet     Sig: Take 0.5 tablets by mouth daily     Dispense:  120 tablet     Refill:  3       Discussed with patient and spouse. Return for Keep upcoming appointment with Dr. Dayday Tatum. I greatly appreciate the opportunity to meet Vivek Hill and your confidence in allowing me to participate in her cardiovascular care. KAMLESH Kaye NP  2/26/2021 9:57 AM CST                    This dictation was generated by voice recognition computer software. Although all attempts are made to edit dictation for accuracy, there may be errors in the transcription that are not intended.

## 2021-03-24 ENCOUNTER — OFFICE VISIT (OUTPATIENT)
Dept: CARDIOLOGY CLINIC | Age: 71
End: 2021-03-24
Payer: MEDICARE

## 2021-03-24 VITALS
BODY MASS INDEX: 40.66 KG/M2 | HEIGHT: 66 IN | SYSTOLIC BLOOD PRESSURE: 110 MMHG | HEART RATE: 105 BPM | OXYGEN SATURATION: 96 % | WEIGHT: 253 LBS | DIASTOLIC BLOOD PRESSURE: 68 MMHG

## 2021-03-24 DIAGNOSIS — I25.10 CORONARY ARTERY DISEASE INVOLVING NATIVE CORONARY ARTERY OF NATIVE HEART WITHOUT ANGINA PECTORIS: Primary | ICD-10-CM

## 2021-03-24 PROCEDURE — 3017F COLORECTAL CA SCREEN DOC REV: CPT | Performed by: INTERNAL MEDICINE

## 2021-03-24 PROCEDURE — 4040F PNEUMOC VAC/ADMIN/RCVD: CPT | Performed by: INTERNAL MEDICINE

## 2021-03-24 PROCEDURE — 1123F ACP DISCUSS/DSCN MKR DOCD: CPT | Performed by: INTERNAL MEDICINE

## 2021-03-24 PROCEDURE — G8427 DOCREV CUR MEDS BY ELIG CLIN: HCPCS | Performed by: INTERNAL MEDICINE

## 2021-03-24 PROCEDURE — G8484 FLU IMMUNIZE NO ADMIN: HCPCS | Performed by: INTERNAL MEDICINE

## 2021-03-24 PROCEDURE — G8400 PT W/DXA NO RESULTS DOC: HCPCS | Performed by: INTERNAL MEDICINE

## 2021-03-24 PROCEDURE — G8417 CALC BMI ABV UP PARAM F/U: HCPCS | Performed by: INTERNAL MEDICINE

## 2021-03-24 PROCEDURE — 1090F PRES/ABSN URINE INCON ASSESS: CPT | Performed by: INTERNAL MEDICINE

## 2021-03-24 PROCEDURE — 1036F TOBACCO NON-USER: CPT | Performed by: INTERNAL MEDICINE

## 2021-03-24 PROCEDURE — 99214 OFFICE O/P EST MOD 30 MIN: CPT | Performed by: INTERNAL MEDICINE

## 2021-03-24 RX ORDER — ALLOPURINOL 100 MG/1
50 TABLET ORAL DAILY
COMMUNITY

## 2021-03-24 RX ORDER — PREDNISONE 1 MG/1
5 TABLET ORAL 2 TIMES DAILY PRN
COMMUNITY

## 2021-03-24 ASSESSMENT — ENCOUNTER SYMPTOMS
BACK PAIN: 0
BLOOD IN STOOL: 0
VOMITING: 0
EYE DISCHARGE: 0
SHORTNESS OF BREATH: 0
COUGH: 0
ABDOMINAL DISTENTION: 0
CONSTIPATION: 0
WHEEZING: 0
DIARRHEA: 0

## 2021-03-24 NOTE — PROGRESS NOTES
[facility]  Cardiology Office Note  [address]  [address]  Phone: [phone]  Fax: [phone]                            Date:  3/24/2021  Patient: Carla Rendon  Age:  79 y. o., 1950    Referral: No ref. provider found      PROBLEM LIST:    Patient Active Problem List    Diagnosis Date Noted    Coronary artery disease involving native coronary artery of native heart      Priority: High    Chest pain 07/28/2020     Priority: Low    Family history of coronary artery disease 10/15/2019     Priority: Low    Abnormal nuclear stress test 10/15/2019     Priority: Low    Diastolic heart failure ([facility] [address] 09/12/2019     Priority: Low    Mixed hyperlipidemia 09/12/2019     Priority: Low    Hx of chronic kidney disease 09/12/2019     Priority: Low    Class 3 severe obesity due to excess calories with serious comorbidity and body mass index (BMI) of 45.0 to 49.9 in adult ([facility] [address] 09/12/2019     Priority: Low    SOB (shortness of breath) 09/12/2019     Priority: Low    Essential hypertension      Priority: Low     1.  Diabetes mellitus long-standing not on insulin. 2.  Mild coronary artery disease with EKG with possible anterolateral and inferior infarct, inferolateral defect on Lexiscan, ejection fraction 60%, cardiac catheterization 10/25/2019 with mid LAD 35%. 3.  Long-standing tobacco use x50 years, stopped 2018 with COPD on continuous oxygen  4.  Severe obesity with limited ambulation. 5.  CKD stage III  6. History of upper GI bleed    PRESENTATION: Carla Rendon is a 79y.o. year old female presents for follow-up evaluation. Since her last visit she was admitted in [facility] with an upper GI bleed. She was taken off aspirin. Her blood pressure was reported as low and she was also taken off atenolol. She has been restarted back on Toprol-XL 25 mg once daily. Patient has been taking intermittent Motrin.   She reports her heart rates are on the higher side though her blood pressures have been on the lower side. No lightheadedness or syncopal episodes. REVIEW OF SYSTEMS:  Review of Systems   Constitutional: Negative for activity change, fatigue and fever. HENT: Negative for ear pain, hearing loss and tinnitus. Eyes: Negative for discharge and visual disturbance. Respiratory: Negative for cough, shortness of breath and wheezing. Cardiovascular: Negative for chest pain, palpitations and leg swelling. Gastrointestinal: Negative for abdominal distention, blood in stool, constipation, diarrhea and vomiting. Endocrine: Negative for cold intolerance, heat intolerance, polydipsia and polyuria. Genitourinary: Negative for dysuria and hematuria. Musculoskeletal: Negative for arthralgias, back pain and myalgias. Skin: Negative for pallor and rash. Neurological: Negative for seizures, syncope, weakness and headaches. Psychiatric/Behavioral: Negative for behavioral problems and dysphoric mood.        Past Medical History:      Diagnosis Date    Arthritis     Chronic congestive heart failure with left ventricular diastolic dysfunction (HCC)     COPD with acute exacerbation (HCC)     Coronary artery disease involving native coronary artery     Dyspnea     Essential hypertension     Gout     Gout     Hyperlipidemia     Hypomagnesemia     Osteoporosis     Type 2 diabetes mellitus (HCC)        Past Surgical History:      Procedure Laterality Date    COLONOSCOPY      CYST REMOVAL      TONSILLECTOMY         Medications:  Current Outpatient Medications   Medication Sig Dispense Refill    allopurinol (ZYLOPRIM) 100 MG tablet Take 100 mg by mouth daily      predniSONE (DELTASONE) 5 MG tablet Take 5 mg by mouth 2 times daily      insulin 70-30 (NOVOLIN 70/30) (70-30) 100 UNIT per ML injection vial 40 in AM  30 in evening and 40 in Pm      vitamin D (ERGOCALCIFEROL) 1.25 MG (06133 UT) CAPS capsule Take 50,000 Units by mouth once a week 3 times a week      metoprolol succinate (TOPROL XL) 25 MG extended release tablet Take 0.5 tablets by mouth daily 120 tablet 3    Magnesium Oxide (MAGNESIUM-OXIDE) 250 MG TABS tablet Take 250 mg by mouth daily       Exenatide (BYDUREON SC) Inject into the skin once a week      ibuprofen (ADVIL;MOTRIN) 200 MG tablet Take 200 mg by mouth every 6 hours as needed for Pain      OXYGEN Inhale into the lungs      Omega-3 Fatty Acids (FISH OIL PO) Take 2,000 mg by mouth daily       acetaminophen (TYLENOL) 500 MG tablet Take 500 mg by mouth every 6 hours as needed for Pain      aspirin-acetaminophen-caffeine (EXCEDRIN MIGRAINE) 250-250-65 MG per tablet Take 1 tablet by mouth every 6 hours as needed for Headaches      furosemide (LASIX) 40 MG tablet Take 40 mg by mouth 2 times daily      gemfibrozil (LOPID) 600 MG tablet Take 600 mg by mouth 2 times daily (before meals)      Linaclotide (LINZESS) 72 MCG CAPS Take 72 mcg by mouth daily      nystatin-triamcinolone (MYCOLOG II) 259490-9.1 UNIT/GM-% cream Apply topically as needed       Probiotic Product (PROBIOTIC ADVANCED PO) Take 1 tablet by mouth daily       levalbuterol (XOPENEX) 1.25 MG/3ML nebulizer solution Inhale 1 ampule into the lungs every 4 hours as needed       CANNABIDIOL PO Take by mouth daily       albuterol (PROVENTIL) 2 MG tablet Take 2 mg by mouth 2 times daily       ezetimibe (ZETIA) 10 MG tablet Take 10 mg by mouth daily      montelukast (SINGULAIR) 10 MG tablet Take 10 mg by mouth nightly      Prenatal Vit-Fe Fumarate-FA (PX PRENATAL MULTIVITAMINS PO) Take 1 tablet by mouth daily       folic acid (FOLVITE) 1 MG tablet Take 1 mg by mouth daily      glimepiride (AMARYL) 2 MG tablet Take 2 mg by mouth 2 times daily       No current facility-administered medications for this visit.         Allergies:  Grapefruit concentrate, Jardiance [empagliflozin], Tetanus immune globulin, Trulicity [dulaglutide], and Zithromax mmHg, pulse 92 bpm regular   HENT:      Mouth/Throat:      Pharynx: No oropharyngeal exudate. Eyes:      General: No scleral icterus. Right eye: No discharge. Left eye: No discharge. Neck:      Thyroid: No thyromegaly. Vascular: No JVD. Cardiovascular:      Rate and Rhythm: Normal rate and regular rhythm. No extrasystoles are present. Heart sounds: Normal heart sounds, S1 normal and S2 normal. No murmur. No systolic murmur. No diastolic murmur. No friction rub. No gallop. No S3 or S4 sounds. Comments: No JVD  Trace edema  No significant systolic or diastolic murmurs noted  No carotid bruits    Pulmonary:      Effort: Pulmonary effort is normal. No respiratory distress. Breath sounds: Normal breath sounds. No wheezing or rales. Chest:      Chest wall: No tenderness. Abdominal:      General: Bowel sounds are normal. There is no distension. Palpations: Abdomen is soft. There is no mass. Tenderness: There is no abdominal tenderness. There is no guarding or rebound. Hernia: No hernia is present. Comments: No palpable organomegaly   Musculoskeletal: Normal range of motion. Skin:     General: Skin is warm. Coloration: Skin is not pale. Findings: No rash. Neurological:      Mental Status: She is alert and oriented to person, place, and time. Cranial Nerves: No cranial nerve deficit. Deep Tendon Reflexes: Reflexes normal.           Labs:   CBC: No results for input(s): WBC, HGB, HCT, PLT in the last 72 hours. BMP:No results for input(s): NA, K, CO2, BUN, CREATININE, LABGLOM, GLUCOSE in the last 72 hours. BNP: No results for input(s): BNP in the last 72 hours. PT/INR: No results for input(s): PROTIME, INR in the last 72 hours. APTT:No results for input(s): APTT in the last 72 hours. CARDIAC ENZYMES:No results for input(s): CKTOTAL, CKMB, CKMBINDEX, TROPONINI in the last 72 hours.   FASTING LIPID PANEL:  Lab Results   Component Value Date    HDL 31 10/16/2019    LDLCALC 86 10/16/2019    TRIG 299 10/16/2019     LIVER PROFILE:No results for input(s): AST, ALT, LABALBU in the last 72 hours. Imaging:          ASSESSMENT and PLAN:    79-year-old female patient with morbid obesity, long-standing diabetes mellitus, non-insulin-requiring, long-standing tobacco use stopped 2018 with COPD on oxygen, CKD stage III, EKG evidence for possible anterolateral and inferior infarct, abnormal Lexiscan for inferolateral defect with normal LV function, symptoms of shortness of breath with exertion and indigestion, cardiac catheterization 10/25/2019 with mild nonobstructive CAD here for follow-up.     1. Given her history of recent upper GI bleed, will continue to hold aspirin at this time. Only minimal CAD noted on catheterization 10/25/2019. Likely related to her increased Motrin use. I have advised her to stop taking Motrin or other nonsteroidals. Continue on PPI. 2.  Her blood pressure on the left arm is higher than the right arm. Would have her check her blood pressures on the left arm going forward. Continue Toprol-XL 25 mg once daily. 3.  She can follow-up with me in 8 months. She did have a Covid vaccinations. Orders:  No orders of the defined types were placed in this encounter. No orders of the defined types were placed in this encounter. Return in about 8 months (around 11/24/2021). Electronically signed by Yoli Cotter MD on 3/24/2021 at 11:08 AM    [facility]      Thisdictation was generated by voice recognition computer software. Although all attempts are made to edit the dictation for accuracy, there may be errors in the transcription that are not intended.

## 2021-06-14 ENCOUNTER — OFFICE VISIT (OUTPATIENT)
Dept: CARDIOLOGY CLINIC | Age: 71
End: 2021-06-14
Payer: MEDICARE

## 2021-06-14 VITALS
OXYGEN SATURATION: 96 % | DIASTOLIC BLOOD PRESSURE: 70 MMHG | SYSTOLIC BLOOD PRESSURE: 118 MMHG | BODY MASS INDEX: 39.86 KG/M2 | WEIGHT: 248 LBS | HEIGHT: 66 IN | HEART RATE: 90 BPM

## 2021-06-14 DIAGNOSIS — I25.10 CORONARY ARTERY DISEASE INVOLVING NATIVE CORONARY ARTERY OF NATIVE HEART WITHOUT ANGINA PECTORIS: Primary | ICD-10-CM

## 2021-06-14 DIAGNOSIS — I10 ESSENTIAL HYPERTENSION: ICD-10-CM

## 2021-06-14 DIAGNOSIS — R06.02 SHORTNESS OF BREATH: ICD-10-CM

## 2021-06-14 DIAGNOSIS — R00.2 PALPITATIONS: ICD-10-CM

## 2021-06-14 DIAGNOSIS — E78.5 DYSLIPIDEMIA: ICD-10-CM

## 2021-06-14 PROCEDURE — G8427 DOCREV CUR MEDS BY ELIG CLIN: HCPCS | Performed by: NURSE PRACTITIONER

## 2021-06-14 PROCEDURE — 93000 ELECTROCARDIOGRAM COMPLETE: CPT | Performed by: NURSE PRACTITIONER

## 2021-06-14 PROCEDURE — G8417 CALC BMI ABV UP PARAM F/U: HCPCS | Performed by: NURSE PRACTITIONER

## 2021-06-14 PROCEDURE — 1036F TOBACCO NON-USER: CPT | Performed by: NURSE PRACTITIONER

## 2021-06-14 PROCEDURE — 4040F PNEUMOC VAC/ADMIN/RCVD: CPT | Performed by: NURSE PRACTITIONER

## 2021-06-14 PROCEDURE — G8400 PT W/DXA NO RESULTS DOC: HCPCS | Performed by: NURSE PRACTITIONER

## 2021-06-14 PROCEDURE — 1090F PRES/ABSN URINE INCON ASSESS: CPT | Performed by: NURSE PRACTITIONER

## 2021-06-14 PROCEDURE — 99214 OFFICE O/P EST MOD 30 MIN: CPT | Performed by: NURSE PRACTITIONER

## 2021-06-14 PROCEDURE — 1123F ACP DISCUSS/DSCN MKR DOCD: CPT | Performed by: NURSE PRACTITIONER

## 2021-06-14 PROCEDURE — 3017F COLORECTAL CA SCREEN DOC REV: CPT | Performed by: NURSE PRACTITIONER

## 2021-06-14 RX ORDER — OMEGA-3S/DHA/EPA/FISH OIL 1000-1400
CAPSULE,DELAYED RELEASE (ENTERIC COATED) ORAL
COMMUNITY

## 2021-06-14 RX ORDER — INSULIN GLARGINE 100 [IU]/ML
INJECTION, SOLUTION SUBCUTANEOUS NIGHTLY
COMMUNITY

## 2021-06-14 RX ORDER — MULTIVIT WITH MINERALS/LUTEIN
250 TABLET ORAL DAILY
COMMUNITY

## 2021-06-14 ASSESSMENT — ENCOUNTER SYMPTOMS
WHEEZING: 0
SORE THROAT: 0
COUGH: 0
SHORTNESS OF BREATH: 1
CHEST TIGHTNESS: 0

## 2021-06-14 NOTE — PATIENT INSTRUCTIONS
Hospitalist Progress Note      PCP: Monika Shen MD    Date of Admission: 8/13/2018    Chief Complaint: bleeding from rectum    Hospital Course: pt presents with lower gi bleed    Subjective: rebled this am small amt    Medications:  Reviewed    Infusion Medications    Insulin Pump - insulin regular U-500 (CONC)      sodium chloride 100 mL/hr at 08/16/18 2141    dextrose 100 mL/hr (08/17/18 2242)     Scheduled Medications    capsaicin   Topical BID    gabapentin  600 mg Oral TID    sodium chloride flush  10 mL Intravenous 2 times per day    [START ON 8/19/2018] levothyroxine  56 mcg Intravenous Daily    And    [START ON 8/19/2018] sodium chloride (PF)  5 mL Intravenous Daily     PRN Meds: dextrose, traMADol, sodium chloride flush, magnesium hydroxide, ondansetron, glucose, dextrose, glucagon (rDNA), dextrose      Intake/Output Summary (Last 24 hours) at 08/17/18 6198  Last data filed at 08/17/18 1002   Gross per 24 hour   Intake              300 ml   Output              500 ml   Net             -200 ml       Exam:    BP (!) 129/51   Pulse 60   Temp 98.6 °F (37 °C) (Oral)   Resp 18   Ht 5' 8\" (1.727 m)   Wt 199 lb 1.2 oz (90.3 kg)   SpO2 97%   BMI 30.27 kg/m²     General appearance: No apparent distress, appears stated age and cooperative. HEENT: Pupils equal, round, and reactive to light. Conjunctivae/corneas clear. Neck: Supple, with full range of motion. No jugular venous distention. Trachea midline. Respiratory:  Normal respiratory effort. Clear to auscultation, bilaterally without RALES/WHEEZES/Rhonchi. Cardiovascular: Regular rate and rhythm with normal S1/S2 without MURMURS, rubs or gallops. Abdomen: Soft, non-tender, non-distended with normal bowel sounds. Musculoskeletal: No clubbing, cyanosis or EDEMA bilaterally. Full range of motion without deformity. Skin: Skin color, texture, turgor normal.  No rashes or lesions.   Neurologic:  Neurovascularly intact without any focal [place] at the [address] [facility] and [address] located on the first floor of [place] [address] through \[facility]\"" main entrance and turn immediately to your left. Date/Time: July 6 1:00    Patient's contact number:  [phone] (home)     Echocardiogram -  No prep. Takes approximately 30 min. An echocardiogram uses sound waves to produce images of your heart. This commonly used test allows your doctor to see how your heart is beating and pumping blood. Your doctor can use the images from an echocardiogram to identify various abnormalities in the heart muscle and valves. This test has 2 parts:   Ø You will be asked to disrobe from the waist up and given a gown to wear. The technologist will then hook up an EKG monitor to you for the entire exam.   Ø You will then have an ultrasound of your heart (echocardiogram) to assess the heart muscle, heart valves and heart function. You may eat and take any medicines before the exam.     If you need to change your appointment, please call outpatient scheduling at [phone]. [place] at the [facility] and [address] located on the first floor of [place] [address] through \[facility]\"" main entrance and turn immediately to your left. Patient's contact number:  [phone] (home)     Date/Time: July 6 th 1:00 Tuesday    [place] at the [address] [facility] and [address] located on the first floor of [place] [address] through \[facility]\"" main entrance and turn immediately to your left. Date/Time:     Patient's contact number:  [phone] (home)     Echocardiogram -  No prep. Takes approximately 30 min. An echocardiogram uses sound waves to produce images of your heart. This commonly used test allows your doctor to see how your heart is beating and pumping blood. Your doctor can use the images from an echocardiogram to identify various abnormalities in the heart muscle and valves. This test has 2 parts:   Ø You will be asked to disrobe from the waist up and given a gown to wear. The technologist will then hook up an EKG monitor to you for the entire exam.   Ø You will then have an ultrasound of your heart (echocardiogram) to assess the heart muscle, heart valves and heart function. You may eat and take any medicines before the exam.     If you need to change your appointment, please call outpatient scheduling at [phone].

## 2021-06-14 NOTE — PROGRESS NOTES
[facility] Cardiology   Established Patient Office Visit   [address] [facility]  [phone]        OFFICE VISIT:  2021    Semaj Briceno - : 1950    Reason For Visit:  Reema Galeano is a 79 y.o. female who is here for Follow-up    1. Coronary artery disease involving native coronary artery of native heart without angina pectoris    2. Essential hypertension    3. Dyslipidemia    4. Shortness of breath    5. Palpitations      Patient with a history of coronary artery disease, hypertension, hyperlipidemia,  diabetes, chronic kidney disease stage III and chronic diastolic heart failure.     She is a patient of Dr. Filomena Yancey.     Patient presents to clinic today with palpitations. Patient states she used her son's monitor and he told her that she had some skipped beats. Patient denies any chest pain, pressure or tightness. She does have shortness of breath due to her COPD and is on oxygen. Patient denies any lightheadedness, dizziness or syncope. DATA:     10/25/19 cath   Angiographic Findings    Cardiac Arteries and Lesion Findings   LMCA: Left Main widely patent.  LAD: LAD mid 35% stenosis. LAD just reaches the apex. 1st diagonal proximal 30% stenosis.   Lesion on Mid LAD: 35% stenosis 14 mm length.   Lesion on 1st Dia% stenosis 10 mm length.  LCx: Circumflex with mild luminal irregularities.  RCA: RCA is a large dominant vessel with moderate luminal irregularities.   LV function assessed as:Normal.          Subjective    Semaj Briceno is a 79 y.o. female with the following history as recorded in [facility]:    Patient Active Problem List    Diagnosis Date Noted    Coronary artery disease involving native coronary artery of native heart     Chest pain 2020    Family history of coronary artery disease 10/15/2019    Abnormal nuclear stress test     Diastolic heart failure ([address] 2019    Mixed hyperlipidemia 2019    Hx of chronic kidney disease 09/12/2019    Class 3 severe obesity due to excess calories with serious comorbidity and body mass index (BMI) of 45.0 to 49.9 in adult ([facility] [address] 09/12/2019    SOB (shortness of breath) 09/12/2019    Essential hypertension      Current Outpatient Medications   Medication Sig Dispense Refill    Ascorbic Acid (VITAMIN C) 250 MG tablet Take 250 mg by mouth daily      Fiber Adult Gummies 2 g CHEW Take by mouth      insulin glargine (BASAGLAR KWIKPEN) 100 UNIT/ML injection pen Inject into the skin nightly 50 units at bedtime      allopurinol (ZYLOPRIM) 100 MG tablet Take 100 mg by mouth daily      predniSONE (DELTASONE) 5 MG tablet Take 5 mg by mouth 2 times daily      insulin 70-30 (NOVOLIN 70/30) (70-30) 100 UNIT per ML injection vial 40 in AM  30 in evening and 40 in Pm      vitamin D (ERGOCALCIFEROL) 1.25 MG (66878 UT) CAPS capsule Take 50,000 Units by mouth once a week 3 times a week      metoprolol succinate (TOPROL XL) 25 MG extended release tablet Take 0.5 tablets by mouth daily 120 tablet 3    Magnesium Oxide (MAGNESIUM-OXIDE) 250 MG TABS tablet Take 250 mg by mouth daily       Exenatide (BYDUREON SC) Inject into the skin once a week      ibuprofen (ADVIL;MOTRIN) 200 MG tablet Take 200 mg by mouth every 6 hours as needed for Pain      OXYGEN Inhale into the lungs      Omega-3 Fatty Acids (FISH OIL PO) Take 2,000 mg by mouth daily       acetaminophen (TYLENOL) 500 MG tablet Take 500 mg by mouth every 6 hours as needed for Pain      aspirin-acetaminophen-caffeine (EXCEDRIN MIGRAINE) 250-250-65 MG per tablet Take 1 tablet by mouth every 6 hours as needed for Headaches      furosemide (LASIX) 40 MG tablet Take 40 mg by mouth 2 times daily      gemfibrozil (LOPID) 600 MG tablet Take 600 mg by mouth 2 times daily (before meals)      Linaclotide (LINZESS) 72 MCG CAPS Take 72 mcg by mouth daily      nystatin-triamcinolone (MYCOLOG II) 639811-0.1 UNIT/GM-% cream Apply topically as needed       Probiotic Product (PROBIOTIC ADVANCED PO) Take 1 tablet by mouth daily       levalbuterol (XOPENEX) 1.25 MG/3ML nebulizer solution Inhale 1 ampule into the lungs every 4 hours as needed       CANNABIDIOL PO Take by mouth daily       albuterol (PROVENTIL) 2 MG tablet Take 2 mg by mouth 2 times daily       ezetimibe (ZETIA) 10 MG tablet Take 10 mg by mouth daily      montelukast (SINGULAIR) 10 MG tablet Take 10 mg by mouth nightly      Prenatal Vit-Fe Fumarate-FA (PX PRENATAL MULTIVITAMINS PO) Take 1 tablet by mouth daily       folic acid (FOLVITE) 1 MG tablet Take 1 mg by mouth daily (Patient not taking: Reported on 6/14/2021)      glimepiride (AMARYL) 2 MG tablet Take 2 mg by mouth 2 times daily (Patient not taking: Reported on 6/14/2021)       No current facility-administered medications for this visit. Allergies: Grapefruit concentrate, Jardiance [empagliflozin], Tetanus immune globulin, Trulicity [dulaglutide], and Zithromax [azithromycin]  Past Medical History:   Diagnosis Date    Arthritis     Chronic congestive heart failure with left ventricular diastolic dysfunction ([facility] [address]     COPD with acute exacerbation (HCC)     Coronary artery disease involving native coronary artery     Dyspnea     Essential hypertension     Gout     Gout     Hyperlipidemia     Hypomagnesemia     Osteoporosis     Type 2 diabetes mellitus ([facility] [address]      Past Surgical History:   Procedure Laterality Date    COLONOSCOPY      CYST REMOVAL      TONSILLECTOMY       History reviewed. No pertinent family history.   Social History     Tobacco Use    Smoking status: Former Smoker     Packs/day: 1.00     Years: 50.00     Pack years: 50.00     Types: Cigarettes     Quit date: 2/1/2018     Years since quitting: 3.3    Smokeless tobacco: Never Used   Substance Use Topics    Alcohol use: Yes     Comment: occassionally          Review of Systems:    Review of Systems   Constitutional: Negative for activity change, chills, diaphoresis, fatigue and fever. HENT: Negative for congestion and sore throat. Respiratory: Positive for shortness of breath. Negative for cough, chest tightness and wheezing. Cardiovascular: Positive for palpitations. Negative for chest pain and leg swelling. Neurological: Negative for dizziness, syncope, light-headedness and headaches. Psychiatric/Behavioral: Negative for confusion. The patient is not nervous/anxious. Objective:    /70   Pulse 90   Ht 5' 6\" (1.676 m)   Wt 248 lb (112.5 kg)   SpO2 96%   BMI 40.03 kg/m²     GENERAL - well developed and well nourished, conversant  HEENT   PERRLA, Hearing appears normal, gentleman lids are normal.  External inspection of ears and nose appear normal.  NECK - no thyromegaly, no JVD, trachea is in the midline  CARDIOVASCULAR  PMI is in the mid line clavicular position, Normal S1 and S2 with a grade 1/6 systolic murmur. No S3 or S4    PULMONARY  no respiratory distress. No wheezes or rales. Lungs are clear to ausculation, normal respiratory effort. ABDOMEN   soft, non tender, no rebound  MUSCULOSKELETAL   range of motion of the upper and lower extermites appears normal and equal and is without pain   EXTREMITIES - no significant edema   NEUROLOGIC  gait and station are normal  SKIN - turgor is normal, can warm and dry. PSYCHIATRIC - normal mood and affect, alert and orientated x 3,      ASSESSMENT:    ALL THE CARDIOLOGY PROBLEMS ARE LISTED ABOVE; HOWEVER, THE FOLLOWING SPECIFIC CARDIAC PROBLEMS / CONDITIONS WERE ADDRESSED AND TREATED DURING THE OFFICE VISIT TODAY:                                                                                            MEDICAL DECISION MAKING             Cardiac Specific Problem / Diagnosis  Discussion and Data Reviewed Diagnostic Procedures Ordered Management Options Selected           1. Shortness of breath  Initial Encounter   Review and summation of old records:    O2 sat in the office 96%. Will order 2D echo to evaluate current EF and for any valvular heart disease. Yes Continue current medications:     Yes           2. CAD  Stable   No chest pain. Patient is on Toprol, fish oil. No Continue current medications:    Yes           3. Palpitations Initial Encounter  EKG in the office showing sinus rhythm with occasional PACs. Patient is on Toprol-XL 12.5 mg daily. Will order 14-day ZIO Patch to evaluate for any atrial fib, SVT or tachycardia. Yes Continue current medications:       Yes           4. Hypertension Well Controlled  pressure in the office today 118/70. Patient is on Toprol-XL 12.5 mg daily. No Continue current medications:       Yes     5. Dyslipidemia -patient is on omega-3. Lopid. Continue present medical management      Orders Placed This Encounter   Procedures    EKG 12 lead     Order Specific Question:   Reason for Exam?     Answer:   Shortness of Breath    Echo 2D w doppler w color complete     Standing Status:   Future     Standing Expiration Date:   6/14/2022     Order Specific Question:   Reason for exam:     Answer:   dyspnea    OH EXTERNAL ECG REC>7D<15D RECORDING     No orders of the defined types were placed in this encounter. Discussed with patient and spouse. Return in about 4 weeks (around 7/12/2021) for results with me . I greatly appreciate the opportunity to meet Vernon Mckenzie and your confidence in allowing me to participate in her cardiovascular care. KAMLESH Santo NP  6/14/2021 11:09 AM CDT                    This dictation was generated by voice recognition computer software. Although all attempts are made to edit dictation for accuracy, there may be errors in the transcription that are not intended.

## 2021-06-25 ENCOUNTER — TELEPHONE (OUTPATIENT)
Dept: NEUROLOGY | Facility: CLINIC | Age: 71
End: 2021-06-25

## 2021-07-02 ENCOUNTER — TELEPHONE (OUTPATIENT)
Dept: NEUROLOGY | Facility: CLINIC | Age: 71
End: 2021-07-02

## 2021-07-02 NOTE — TELEPHONE ENCOUNTER
----- Message from Jose Meier PA-C sent at 7/2/2021 11:31 AM CDT -----  Regarding: RE:  Provide records of the extensive nature of the work up for review first.  ----- Message -----  From: Melody Hannon LPN  Sent: 7/2/2021   9:18 AM CDT  To: Jose Meier PA-C    Received a message from the [facility] wants to know if we will see Yane for a persistent itch over her entire body.  I called them, they said they have exhausted all other options, she has seen everyone else, someone suggested a referral to neurology.  What would you like me to tell them?

## 2021-07-06 ENCOUNTER — HOSPITAL ENCOUNTER (OUTPATIENT)
Dept: NON INVASIVE DIAGNOSTICS | Age: 71
Discharge: HOME | End: 2021-07-06
Payer: MEDICARE

## 2021-07-06 DIAGNOSIS — R06.02 SHORTNESS OF BREATH: ICD-10-CM

## 2021-07-06 LAB
LV EF: 58 %
LVEF MODALITY: NORMAL

## 2021-07-06 PROCEDURE — 93306 TTE W/DOPPLER COMPLETE: CPT

## 2021-07-07 ENCOUNTER — TELEPHONE (OUTPATIENT)
Dept: CARDIOLOGY CLINIC | Age: 71
End: 2021-07-07

## 2021-07-07 NOTE — TELEPHONE ENCOUNTER
Patient is aware of normal Echo results and will f/u at next weeks appointment for [facility]

## 2021-07-07 NOTE — RESULT ENCOUNTER NOTE
Patient is aware of normal results and will f/u at next weeks appointment for [facility]

## 2021-07-13 ENCOUNTER — OFFICE VISIT (OUTPATIENT)
Dept: CARDIOLOGY CLINIC | Age: 71
End: 2021-07-13
Payer: MEDICARE

## 2021-07-13 VITALS
OXYGEN SATURATION: 96 % | HEIGHT: 66 IN | DIASTOLIC BLOOD PRESSURE: 70 MMHG | SYSTOLIC BLOOD PRESSURE: 132 MMHG | WEIGHT: 248 LBS | HEART RATE: 95 BPM | BODY MASS INDEX: 39.86 KG/M2

## 2021-07-13 DIAGNOSIS — I47.1 SVT (SUPRAVENTRICULAR TACHYCARDIA) (HCC): ICD-10-CM

## 2021-07-13 DIAGNOSIS — E78.5 DYSLIPIDEMIA: ICD-10-CM

## 2021-07-13 DIAGNOSIS — I50.32 CHRONIC DIASTOLIC HEART FAILURE (HCC): ICD-10-CM

## 2021-07-13 DIAGNOSIS — I10 ESSENTIAL HYPERTENSION: ICD-10-CM

## 2021-07-13 DIAGNOSIS — I25.10 CORONARY ARTERY DISEASE INVOLVING NATIVE CORONARY ARTERY OF NATIVE HEART WITHOUT ANGINA PECTORIS: Primary | ICD-10-CM

## 2021-07-13 PROCEDURE — G8427 DOCREV CUR MEDS BY ELIG CLIN: HCPCS | Performed by: NURSE PRACTITIONER

## 2021-07-13 PROCEDURE — 99214 OFFICE O/P EST MOD 30 MIN: CPT | Performed by: NURSE PRACTITIONER

## 2021-07-13 PROCEDURE — 1090F PRES/ABSN URINE INCON ASSESS: CPT | Performed by: NURSE PRACTITIONER

## 2021-07-13 PROCEDURE — 4040F PNEUMOC VAC/ADMIN/RCVD: CPT | Performed by: NURSE PRACTITIONER

## 2021-07-13 PROCEDURE — 1036F TOBACCO NON-USER: CPT | Performed by: NURSE PRACTITIONER

## 2021-07-13 PROCEDURE — 3017F COLORECTAL CA SCREEN DOC REV: CPT | Performed by: NURSE PRACTITIONER

## 2021-07-13 PROCEDURE — G8417 CALC BMI ABV UP PARAM F/U: HCPCS | Performed by: NURSE PRACTITIONER

## 2021-07-13 PROCEDURE — 1123F ACP DISCUSS/DSCN MKR DOCD: CPT | Performed by: NURSE PRACTITIONER

## 2021-07-13 PROCEDURE — G8400 PT W/DXA NO RESULTS DOC: HCPCS | Performed by: NURSE PRACTITIONER

## 2021-07-13 ASSESSMENT — ENCOUNTER SYMPTOMS
CHEST TIGHTNESS: 0
SORE THROAT: 0
SHORTNESS OF BREATH: 1
WHEEZING: 0
COUGH: 0

## 2021-07-13 NOTE — PROGRESS NOTES
[facility]   Established Patient Office Visit   Kiara [facility]. [address]  [phone]        OFFICE VISIT:  2021    Tomasz Inez - : 1950    Reason For Visit:  Franck Robledo is a 79 y.o. female who is here for Follow-up (Zio results )    1. Coronary artery disease involving native coronary artery of native heart without angina pectoris    2. Essential hypertension    3. Dyslipidemia    4. Chronic diastolic heart failure ([facility] [address]    5. SVT (supraventricular tachycardia) ([facility] [address]      Patient with a history of coronary artery disease, hypertension, hyperlipidemia,  diabetes, chronic kidney disease stage III and chronic diastolic heart failure.     She is a patient of Dr. Valerie Amador. Patient was last seen in the office on 2021 with some complaints of shortness of breath due to her COPD. Patient is on oxygen. Patient also had complaints of palpitations. ZIO Patch placed. 2D echo was ordered which showed:  Mitral valve leaflets are mildly thickened with preserved leaflet mobility. Mildly thickened aortic valve leaflets with preserved leaflet mobility. Tricuspid valve is structurally normal.   Mild tricuspid regurgitation with estimated RVSP of 31 mm Hg. Normal left ventricular size with preserved LV function and an estimated   ejection fraction of approximately 55-60%. Moderate concentric left ventricular hypertrophy. No regional wall motion abnormalities. ZIO Patch showed:  Sinus rhythm with a minimum heart rate of 67 bpm, maximum 115 bpm.  Average 84 bpm.  2 VT runs. Fastest 4 beats. 132 SVT runs longest being 17 seconds with an average rate of 100 bpm.  Isolated PVCs 5.5%. Isolated PACs 3.8%. Patient presents to clinic today for follow-up of the aforementioned test.  Denies any chest pain, pressure or tightness. Shortness of breath baseline no worsening. There is no orthopnea or PND. Patient denies any lightheadedness, dizziness or syncope.   Patient has occasional episodes of tachycardia at home more in the evening. DATA:  10/25/19 cath   Angiographic Findings    Cardiac Arteries and Lesion Findings   LMCA: Left Main widely patent.  LAD: LAD mid 35% stenosis. LAD just reaches the apex. 1st diagonal proximal 30% stenosis.   Lesion on Mid LAD: 35% stenosis 14 mm length.   Lesion on 1st Dia% stenosis 10 mm length.  LCx: Circumflex with mild luminal irregularities.  RCA: RCA is a large dominant vessel with moderate luminal irregularities. LV function assessed as:Normal.         Danilo Ziegler is a 79 y.o. female with the following history as recorded in [facility]:    Patient Active Problem List    Diagnosis Date Noted    Coronary artery disease involving native coronary artery of native heart     Chest pain 2020    Family history of coronary artery disease 10/15/2019    Abnormal nuclear stress test     Diastolic heart failure ([facility] [address] 2019    Mixed hyperlipidemia 2019    Hx of chronic kidney disease 2019    Class 3 severe obesity due to excess calories with serious comorbidity and body mass index (BMI) of 45.0 to 49.9 in adult ([facility] [address] 2019    SOB (shortness of breath) 2019    Essential hypertension      Current Outpatient Medications   Medication Sig Dispense Refill    Ascorbic Acid (VITAMIN C) 250 MG tablet Take 250 mg by mouth daily      Fiber Adult Gummies 2 g CHEW Take by mouth      insulin glargine (BASAGLAR KWIKPEN) 100 UNIT/ML injection pen Inject into the skin nightly 50 units at bedtime      allopurinol (ZYLOPRIM) 100 MG tablet Take 100 mg by mouth daily      predniSONE (DELTASONE) 5 MG tablet Take 5 mg by mouth 2 times daily      insulin 70-30 (NOVOLIN 70/30) (70-30) 100 UNIT per ML injection vial 40 in AM  30 in evening and 40 in Pm      vitamin D (ERGOCALCIFEROL) 1.25 MG (92357 UT) CAPS capsule Take 50,000 Units by mouth once a week 3 times a week      metoprolol succinate (TOPROL XL) 25 MG extended release tablet Take 0.5 tablets by mouth daily 120 tablet 3    Magnesium Oxide (MAGNESIUM-OXIDE) 250 MG TABS tablet Take 250 mg by mouth daily       folic acid (FOLVITE) 1 MG tablet Take 1 mg by mouth daily       Exenatide (BYDUREON SC) Inject into the skin once a week      ibuprofen (ADVIL;MOTRIN) 200 MG tablet Take 200 mg by mouth every 6 hours as needed for Pain      OXYGEN Inhale into the lungs      Omega-3 Fatty Acids (FISH OIL PO) Take 2,000 mg by mouth daily       acetaminophen (TYLENOL) 500 MG tablet Take 500 mg by mouth every 6 hours as needed for Pain      aspirin-acetaminophen-caffeine (EXCEDRIN MIGRAINE) 250-250-65 MG per tablet Take 1 tablet by mouth every 6 hours as needed for Headaches      glimepiride (AMARYL) 2 MG tablet Take 2 mg by mouth 2 times daily       furosemide (LASIX) 40 MG tablet Take 40 mg by mouth 2 times daily      gemfibrozil (LOPID) 600 MG tablet Take 600 mg by mouth 2 times daily (before meals)      Linaclotide (LINZESS) 72 MCG CAPS Take 72 mcg by mouth daily      nystatin-triamcinolone (MYCOLOG II) 846455-8.1 UNIT/GM-% cream Apply topically as needed       Probiotic Product (PROBIOTIC ADVANCED PO) Take 1 tablet by mouth daily       levalbuterol (XOPENEX) 1.25 MG/3ML nebulizer solution Inhale 1 ampule into the lungs every 4 hours as needed       CANNABIDIOL PO Take by mouth daily       albuterol (PROVENTIL) 2 MG tablet Take 2 mg by mouth 2 times daily       ezetimibe (ZETIA) 10 MG tablet Take 10 mg by mouth daily      montelukast (SINGULAIR) 10 MG tablet Take 10 mg by mouth nightly      Prenatal Vit-Fe Fumarate-FA (PX PRENATAL MULTIVITAMINS PO) Take 1 tablet by mouth daily        No current facility-administered medications for this visit.      Allergies: Grapefruit concentrate, Jardiance [empagliflozin], Tetanus immune globulin, Trulicity [dulaglutide], and Zithromax [azithromycin]  Past Medical History:   Diagnosis Date    Arthritis  Chronic congestive heart failure with left ventricular diastolic dysfunction ([address]     COPD with acute exacerbation (HCC)     Coronary artery disease involving native coronary artery     Dyspnea     Essential hypertension     Gout     Gout     Hyperlipidemia     Hypomagnesemia     Osteoporosis     Type 2 diabetes mellitus ([facility] [address]      Past Surgical History:   Procedure Laterality Date    COLONOSCOPY      CYST REMOVAL      TONSILLECTOMY       History reviewed. No pertinent family history. Social History     Tobacco Use    Smoking status: Former Smoker     Packs/day: 1.00     Years: 50.00     Pack years: 50.00     Types: Cigarettes     Quit date: 2/1/2018     Years since quitting: 3.4    Smokeless tobacco: Never Used   Substance Use Topics    Alcohol use: Yes     Comment: occassionally          Review of Systems:    Review of Systems   Constitutional: Negative for activity change, chills, diaphoresis, fatigue and fever. HENT: Negative for congestion and sore throat. Respiratory: Positive for shortness of breath (Baseline no worsening). Negative for cough, chest tightness and wheezing. Cardiovascular: Negative for chest pain, palpitations and leg swelling. Neurological: Negative for dizziness, syncope, light-headedness and headaches. Psychiatric/Behavioral: Negative for confusion. The patient is not nervous/anxious. Objective:    /70   Pulse 95   Ht 5' 6\" (1.676 m)   Wt 248 lb (112.5 kg)   SpO2 96%   BMI 40.03 kg/m²     GENERAL - well developed and well nourished, conversant  HEENT   PERRLA, Hearing appears normal, gentleman lids are normal.  External inspection of ears and nose appear normal.  NECK - no thyromegaly, no JVD, trachea is in the midline  CARDIOVASCULAR  PMI is in the mid line clavicular position, Normal S1 and S2 with no systolic murmur. No S3 or S4    PULMONARY  no respiratory distress. No wheezes or rales.  Lungs are clear to ausculation, normal respiratory effort. ABDOMEN   soft, non tender, no rebound  MUSCULOSKELETAL   range of motion of the upper and lower extermites appears normal and equal and is without pain   EXTREMITIES - no significant edema   NEUROLOGIC  gait and station are normal  SKIN - turgor is normal, can warm and dry. PSYCHIATRIC - normal mood and affect, alert and orientated x 3,      ASSESSMENT:    ALL THE CARDIOLOGY PROBLEMS ARE LISTED ABOVE; HOWEVER, THE FOLLOWING SPECIFIC CARDIAC PROBLEMS / CONDITIONS WERE ADDRESSED AND TREATED DURING THE OFFICE VISIT TODAY:                                                                                            MEDICAL DECISION MAKING             Cardiac Specific Problem / Diagnosis  Discussion and Data Reviewed Diagnostic Procedures Ordered Management Options Selected           1. SVT  Initial Encounter   Review and summation of old records: Will increase Toprol-XL to 12.5 mg twice a day. Patient to keep blood pressure and pulse log. No Continue current medications:     Yes           2. Hypertension  Stable   Blood pressure in the office today 132/70. O2 sat 96%. Patient is on Toprol XL. No Continue current medications:    Yes           3. Dyslipidemia Stable  patient is on Lopid 600 mg twice daily. No Continue current medications: Yes                     No orders of the defined types were placed in this encounter. No orders of the defined types were placed in this encounter. Discussed with patient and spouse. Return in about 3 months (around 10/13/2021) for Routine with me. I greatly appreciate the opportunity to meet Jb Kwong and your confidence in allowing me to participate in her cardiovascular care. KAMLESH Bauer NP  7/13/2021 10:48 AM CDT                    This dictation was generated by voice recognition computer software.  Although all attempts are made to edit dictation for accuracy, there may be errors in the transcription that are not intended.

## 2021-10-12 ENCOUNTER — OFFICE VISIT (OUTPATIENT)
Dept: CARDIOLOGY CLINIC | Age: 71
End: 2021-10-12
Payer: MEDICARE

## 2021-10-12 VITALS
BODY MASS INDEX: 40.98 KG/M2 | OXYGEN SATURATION: 95 % | HEART RATE: 100 BPM | SYSTOLIC BLOOD PRESSURE: 134 MMHG | WEIGHT: 255 LBS | HEIGHT: 66 IN | DIASTOLIC BLOOD PRESSURE: 78 MMHG

## 2021-10-12 DIAGNOSIS — E78.5 HYPERLIPIDEMIA, UNSPECIFIED HYPERLIPIDEMIA TYPE: ICD-10-CM

## 2021-10-12 DIAGNOSIS — I25.10 CORONARY ARTERY DISEASE INVOLVING NATIVE CORONARY ARTERY OF NATIVE HEART WITHOUT ANGINA PECTORIS: Primary | ICD-10-CM

## 2021-10-12 DIAGNOSIS — I10 ESSENTIAL HYPERTENSION: ICD-10-CM

## 2021-10-12 PROCEDURE — 4040F PNEUMOC VAC/ADMIN/RCVD: CPT | Performed by: NURSE PRACTITIONER

## 2021-10-12 PROCEDURE — G8400 PT W/DXA NO RESULTS DOC: HCPCS | Performed by: NURSE PRACTITIONER

## 2021-10-12 PROCEDURE — 1123F ACP DISCUSS/DSCN MKR DOCD: CPT | Performed by: NURSE PRACTITIONER

## 2021-10-12 PROCEDURE — 99214 OFFICE O/P EST MOD 30 MIN: CPT | Performed by: NURSE PRACTITIONER

## 2021-10-12 PROCEDURE — G8427 DOCREV CUR MEDS BY ELIG CLIN: HCPCS | Performed by: NURSE PRACTITIONER

## 2021-10-12 PROCEDURE — G8417 CALC BMI ABV UP PARAM F/U: HCPCS | Performed by: NURSE PRACTITIONER

## 2021-10-12 PROCEDURE — 1036F TOBACCO NON-USER: CPT | Performed by: NURSE PRACTITIONER

## 2021-10-12 PROCEDURE — 1090F PRES/ABSN URINE INCON ASSESS: CPT | Performed by: NURSE PRACTITIONER

## 2021-10-12 PROCEDURE — 3017F COLORECTAL CA SCREEN DOC REV: CPT | Performed by: NURSE PRACTITIONER

## 2021-10-12 PROCEDURE — G8484 FLU IMMUNIZE NO ADMIN: HCPCS | Performed by: NURSE PRACTITIONER

## 2021-10-12 RX ORDER — TEMAZEPAM 15 MG/1
CAPSULE ORAL
COMMUNITY
Start: 2021-09-16

## 2021-10-12 RX ORDER — INSULIN GLARGINE 100 [IU]/ML
65 INJECTION, SOLUTION SUBCUTANEOUS NIGHTLY
COMMUNITY

## 2021-10-12 ASSESSMENT — ENCOUNTER SYMPTOMS
SHORTNESS OF BREATH: 0
CHEST TIGHTNESS: 0
SORE THROAT: 0
COUGH: 0
WHEEZING: 0

## 2021-10-12 NOTE — PROGRESS NOTES
[facility]   Established Patient Office Visit   Kiara vd. [address]  [phone]        OFFICE VISIT:  10/12/2021    Ludwin Matthews - : 1950    Reason For Visit:  [facility] is a 79 y.o. female who is here for 3 Month Follow-Up and Coronary Artery Disease    1. Coronary artery disease involving native coronary artery of native heart without angina pectoris    2. Essential hypertension    3. Hyperlipidemia, unspecified hyperlipidemia type      Patient with a history of coronary artery disease, hypertension, hyperlipidemia,  diabetes, chronic kidney disease stage III and chronic diastolic heart failure.     She is a patient of Dr. Shona Soriano.     Patient was  seen in the office on 2021 with some complaints of shortness of breath due to her COPD. Patient is on oxygen. Patient also had complaints of palpitations. ZIO Patch placed. 2D echo was ordered which showed:  Mitral valve leaflets are mildly thickened with preserved leaflet mobility. Mildly thickened aortic valve leaflets with preserved leaflet mobility.   Tricuspid valve is structurally normal.   Mild tricuspid regurgitation with estimated RVSP of 31 mm Hg. Normal left ventricular size with preserved LV function and an estimated   ejection fraction of approximately 55-60%.   Moderate concentric left ventricular hypertrophy.   No regional wall motion abnormalities.       ZIO Patch showed:  Sinus rhythm with a minimum heart rate of 67 bpm, maximum 115 bpm.  Average 84 bpm.  2 VT runs. Fastest 4 beats. 132 SVT runs longest being 17 seconds with an average     DATA:  10/25/19 cath   Angiographic Findings    Cardiac Arteries and Lesion Findings   LMCA: Left Main widely patent.  LAD: LAD mid 35% stenosis. LAD just reaches the apex. 1st diagonal proximal 30% stenosis.   Lesion on Mid LAD: 35% stenosis 14 mm length.   Lesion on 1st Dia% stenosis 10 mm length.  LCx: Circumflex with mild luminal irregularities.  RCA: RCA is a large dominant vessel with moderate luminal irregularities. LV function assessed as:Normal.    Patient presents to clinic today for routine follow-up. Patient denies any complaints of chest pain, pressure or tightness. There is no shortness of breath, orthopnea or PND. Patient denies any lightheadedness, dizziness or syncope. Patient's main complaint is constant itching from her insulin. Subjective    Mirza Zapata is a 79 y.o. female with the following history as recorded in [facility]:    Patient Active Problem List    Diagnosis Date Noted    Coronary artery disease involving native coronary artery of native heart     Chest pain 07/28/2020    Family history of coronary artery disease 10/15/2019    Abnormal nuclear stress test 62/14/2256    Diastolic heart failure ([facility] [address] 09/12/2019    Mixed hyperlipidemia 09/12/2019    Hx of chronic kidney disease 09/12/2019    Class 3 severe obesity due to excess calories with serious comorbidity and body mass index (BMI) of 45.0 to 49.9 in adult ([facility] [address] 09/12/2019    SOB (shortness of breath) 09/12/2019    Essential hypertension      Current Outpatient Medications   Medication Sig Dispense Refill    insulin regular (HUMULIN R;NOVOLIN R) 100 UNIT/ML injection Inject into the skin See Admin Instructions SLiding scale.  55 units      Semaglutide (OZEMPIC, 0.25 OR 0.5 MG/DOSE, SC) Inject into the skin once a week      insulin glargine (LANTUS) 100 UNIT/ML injection vial Inject 65 Units into the skin nightly      temazepam (RESTORIL) 15 MG capsule       Ascorbic Acid (VITAMIN C) 250 MG tablet Take 250 mg by mouth daily      Fiber Adult Gummies 2 g CHEW Take by mouth      allopurinol (ZYLOPRIM) 100 MG tablet Take 50 mg by mouth daily       predniSONE (DELTASONE) 5 MG tablet Take 5 mg by mouth 2 times daily as needed       insulin 70-30 (NOVOLIN 70/30) (70-30) 100 UNIT per ML injection vial 40 in AM  30 in evening and 40 in Pm      vitamin D (ERGOCALCIFEROL) 1.25 MG (15114 UT) CAPS capsule Take 50,000 Units by mouth once a week 3 times a week      metoprolol succinate (TOPROL XL) 25 MG extended release tablet Take 0.5 tablets by mouth daily 120 tablet 3    Magnesium Oxide (MAGNESIUM-OXIDE) 250 MG TABS tablet Take 250 mg by mouth daily       folic acid (FOLVITE) 1 MG tablet Take 1 mg by mouth daily       Exenatide (BYDUREON SC) Inject into the skin once a week      ibuprofen (ADVIL;MOTRIN) 200 MG tablet Take 200 mg by mouth every 6 hours as needed for Pain      OXYGEN Inhale into the lungs      Omega-3 Fatty Acids (FISH OIL PO) Take 2,000 mg by mouth daily       acetaminophen (TYLENOL) 500 MG tablet Take 500 mg by mouth every 6 hours as needed for Pain      aspirin-acetaminophen-caffeine (EXCEDRIN MIGRAINE) 250-250-65 MG per tablet Take 1 tablet by mouth every 6 hours as needed for Headaches      glimepiride (AMARYL) 2 MG tablet Take 2 mg by mouth 2 times daily       furosemide (LASIX) 40 MG tablet Take 40 mg by mouth 2 times daily      gemfibrozil (LOPID) 600 MG tablet Take 600 mg by mouth 2 times daily (before meals)      Linaclotide (LINZESS) 72 MCG CAPS Take 72 mcg by mouth daily      nystatin-triamcinolone (MYCOLOG II) 842803-3.1 UNIT/GM-% cream Apply topically as needed       Probiotic Product (PROBIOTIC ADVANCED PO) Take 1 tablet by mouth daily       levalbuterol (XOPENEX) 1.25 MG/3ML nebulizer solution Inhale 1 ampule into the lungs every 4 hours as needed       CANNABIDIOL PO Take by mouth daily       albuterol (PROVENTIL) 2 MG tablet Take 2 mg by mouth 2 times daily       ezetimibe (ZETIA) 10 MG tablet Take 10 mg by mouth daily      montelukast (SINGULAIR) 10 MG tablet Take 10 mg by mouth nightly      Prenatal Vit-Fe Fumarate-FA (PX PRENATAL MULTIVITAMINS PO) Take 1 tablet by mouth daily       insulin glargine (BASAGLAR KWIKPEN) 100 UNIT/ML injection pen Inject into the skin nightly 50 units at bedtime (Patient not taking: Reported on 10/12/2021)       No current facility-administered medications for this visit. Allergies: Grapefruit concentrate, Jardiance [empagliflozin], Tetanus immune globulin, Trulicity [dulaglutide], and Zithromax [azithromycin]  Past Medical History:   Diagnosis Date    Arthritis     Chronic congestive heart failure with left ventricular diastolic dysfunction ([facility] [address]     COPD with acute exacerbation (HCC)     Coronary artery disease involving native coronary artery     Dyspnea     Essential hypertension     Gout     Gout     Hyperlipidemia     Hypomagnesemia     Osteoporosis     Type 2 diabetes mellitus ([facility] [address]      Past Surgical History:   Procedure Laterality Date    COLONOSCOPY      CYST REMOVAL      TONSILLECTOMY       History reviewed. No pertinent family history. Social History     Tobacco Use    Smoking status: Former Smoker     Packs/day: 1.00     Years: 50.00     Pack years: 50.00     Types: Cigarettes     Quit date: 2/1/2018     Years since quitting: 3.6    Smokeless tobacco: Never Used   Substance Use Topics    Alcohol use: Yes     Comment: occassionally          Review of Systems:    Review of Systems   Constitutional: Negative for activity change, chills, diaphoresis, fatigue and fever. HENT: Negative for congestion and sore throat. Respiratory: Negative for cough, chest tightness, shortness of breath and wheezing. Cardiovascular: Negative for chest pain, palpitations and leg swelling. Neurological: Negative for dizziness, syncope, light-headedness and headaches. Psychiatric/Behavioral: Negative for confusion. The patient is not nervous/anxious.          Objective:    /78   Pulse 100   Ht 5' 6\" (1.676 m)   Wt 255 lb (115.7 kg)   SpO2 95%   BMI 41.16 kg/m²     GENERAL - well developed and well nourished, conversant  HEENT   PERRLA, Hearing appears normal, gentleman lids are normal.  External inspection of ears and nose appear normal.  NECK - no thyromegaly, no JVD, trachea is in the midline  CARDIOVASCULAR  PMI is in the mid line clavicular position, Normal S1 and S2 with no systolic murmur. No S3 or S4    PULMONARY  no respiratory distress. No wheezes or rales. Lungs are clear to ausculation, normal respiratory effort. ABDOMEN   soft, non tender, no rebound  MUSCULOSKELETAL   range of motion of the upper and lower extermites appears normal and equal and is without pain   EXTREMITIES - no significant edema   NEUROLOGIC  gait and station are normal  SKIN - turgor is normal, can warm and dry. PSYCHIATRIC - normal mood and affect, alert and orientated x 3,      ASSESSMENT:    ALL THE CARDIOLOGY PROBLEMS ARE LISTED ABOVE; HOWEVER, THE FOLLOWING SPECIFIC CARDIAC PROBLEMS / CONDITIONS WERE ADDRESSED AND TREATED DURING THE OFFICE VISIT TODAY:                                                                                            MEDICAL DECISION MAKING             Cardiac Specific Problem / Diagnosis  Discussion and Data Reviewed Diagnostic Procedures Ordered Management Options Selected           1. CAD  Stable   Review and summation of old records:    No chest pain. Patient is on Toprol, omega-3. No Continue current medications:     Yes           2. Hypertension  Stable   Blood pressure in the office today 134/78. O2 sat 95%. Patient is on Toprol XL 12.5mg daily No Continue current medications:    Yes           3. Diastolic Heart Failure  Stable  pressure stable. No overt signs of failure. No Continue current medications: Yes                     No orders of the defined types were placed in this encounter. No orders of the defined types were placed in this encounter. Discussed with patient and spouse. No follow-ups on file. I greatly appreciate the opportunity to meet Serapio Goodpasture and your confidence in allowing me to participate in her cardiovascular care.     KAMLESH Farley NP  10/12/2021 11:01 AM CDT                    This dictation was generated by voice recognition computer software. Although all attempts are made to edit dictation for accuracy, there may be errors in the transcription that are not intended.

## 2021-12-06 ENCOUNTER — TELEPHONE (OUTPATIENT)
Dept: CARDIOLOGY CLINIC | Age: 71
End: 2021-12-06

## 2021-12-29 ENCOUNTER — TELEPHONE (OUTPATIENT)
Dept: CARDIOLOGY CLINIC | Age: 71
End: 2021-12-29

## 2021-12-29 NOTE — TELEPHONE ENCOUNTER
Keyla Nicholson  Remy Weaver called in to see if Beth Wiley can get cardiac clearance to have cataract surgery completed at [facility] in January. Patient is needing clearance before date is set. [address]: [phone] FAX: [phone]. Please advise.  Thank you

## 2021-12-29 NOTE — TELEPHONE ENCOUNTER
Spoke to pt and explained that who ever is doing procedure must fax us a request. Pt verbally understood.

## 2021-12-30 ENCOUNTER — TELEPHONE (OUTPATIENT)
Dept: CARDIOLOGY CLINIC | Age: 71
End: 2021-12-30

## 2021-12-30 NOTE — TELEPHONE ENCOUNTER
Date: TBD    Cardiologist: Dr. Curt Clayton    Procedure: Cataract surgery    Surgeon: Dr. Rodri Crawford    Last Office Visit: 10-12-21    Reason for office visit and medical concerns addressed at this office visit: CAD, CHF, HTN, DM, hyperlipidemia    Testing Performed and Date of Service:  Echo 7-6-21    Does the patient have a stent? If so, what type? Not in last year     Current Medications: insulin, ozempic, restoril, vit C, zyloprim, deltasone, toprol, magnesium, folic acid, amaryl, lasix, lopid, linzess, xopenex, albuterol, singulair    Is the patient currently taking an anticoagulant? If so, what is the diagnosis the patient has been given to warrant the need for the anticoagulant?  ASA    Additional Notes: med hold on ASA

## 2022-03-10 RX ORDER — METOPROLOL SUCCINATE 25 MG/1
TABLET, EXTENDED RELEASE ORAL
Qty: 45 TABLET | Refills: 0 | Status: SHIPPED | OUTPATIENT
Start: 2022-03-10 | End: 2022-06-07

## 2022-04-27 ENCOUNTER — OFFICE VISIT (OUTPATIENT)
Dept: CARDIOLOGY CLINIC | Age: 72
End: 2022-04-27
Payer: MEDICARE

## 2022-04-27 VITALS
BODY MASS INDEX: 42.59 KG/M2 | SYSTOLIC BLOOD PRESSURE: 100 MMHG | DIASTOLIC BLOOD PRESSURE: 62 MMHG | WEIGHT: 265 LBS | HEART RATE: 96 BPM | HEIGHT: 66 IN

## 2022-04-27 DIAGNOSIS — I10 ESSENTIAL HYPERTENSION: Primary | ICD-10-CM

## 2022-04-27 DIAGNOSIS — I25.10 CORONARY ARTERY DISEASE INVOLVING NATIVE CORONARY ARTERY OF NATIVE HEART WITHOUT ANGINA PECTORIS: ICD-10-CM

## 2022-04-27 PROCEDURE — 1090F PRES/ABSN URINE INCON ASSESS: CPT | Performed by: INTERNAL MEDICINE

## 2022-04-27 PROCEDURE — G8400 PT W/DXA NO RESULTS DOC: HCPCS | Performed by: INTERNAL MEDICINE

## 2022-04-27 PROCEDURE — 99214 OFFICE O/P EST MOD 30 MIN: CPT | Performed by: INTERNAL MEDICINE

## 2022-04-27 PROCEDURE — 3017F COLORECTAL CA SCREEN DOC REV: CPT | Performed by: INTERNAL MEDICINE

## 2022-04-27 PROCEDURE — 1123F ACP DISCUSS/DSCN MKR DOCD: CPT | Performed by: INTERNAL MEDICINE

## 2022-04-27 PROCEDURE — 1036F TOBACCO NON-USER: CPT | Performed by: INTERNAL MEDICINE

## 2022-04-27 PROCEDURE — G8427 DOCREV CUR MEDS BY ELIG CLIN: HCPCS | Performed by: INTERNAL MEDICINE

## 2022-04-27 PROCEDURE — 4040F PNEUMOC VAC/ADMIN/RCVD: CPT | Performed by: INTERNAL MEDICINE

## 2022-04-27 PROCEDURE — G8417 CALC BMI ABV UP PARAM F/U: HCPCS | Performed by: INTERNAL MEDICINE

## 2022-04-27 RX ORDER — GABAPENTIN 300 MG/1
300 CAPSULE ORAL 2 TIMES DAILY
COMMUNITY

## 2022-04-27 RX ORDER — PANTOPRAZOLE SODIUM 40 MG/1
40 TABLET, DELAYED RELEASE ORAL DAILY
COMMUNITY

## 2022-04-27 RX ORDER — AMOXICILLIN 875 MG/1
875 TABLET, COATED ORAL
COMMUNITY

## 2022-04-27 RX ORDER — COLCHICINE 0.6 MG/1
0.6 TABLET ORAL DAILY
COMMUNITY

## 2022-04-27 RX ORDER — SACCHAROMYCES BOULARDII 250 MG
250 CAPSULE ORAL 2 TIMES DAILY
COMMUNITY

## 2022-04-27 ASSESSMENT — ENCOUNTER SYMPTOMS
SHORTNESS OF BREATH: 1
CONSTIPATION: 0
EYE DISCHARGE: 0
COUGH: 0
BACK PAIN: 0
ABDOMINAL DISTENTION: 0
WHEEZING: 0
VOMITING: 0
DIARRHEA: 0
BLOOD IN STOOL: 0

## 2022-04-27 NOTE — PROGRESS NOTES
[facility]  Cardiology Office Note  [address] [phone] [id number]  Phone: [phone]  Fax: [phone]                            Date:  4/27/2022  Patient: Rhonda Green  Age:  70 y. o., 1950    Referral: No ref. provider found      PROBLEM LIST:    Patient Active Problem List    Diagnosis Date Noted    Coronary artery disease involving native coronary artery of native heart      Priority: High    Chest pain 07/28/2020     Priority: Low    Family history of coronary artery disease 10/15/2019     Priority: Low    Abnormal nuclear stress test 10/15/2019     Priority: Low    Diastolic heart failure ([facility] [address] 09/12/2019     Priority: Low    Mixed hyperlipidemia 09/12/2019     Priority: Low    Hx of chronic kidney disease 09/12/2019     Priority: Low    Class 3 severe obesity due to excess calories with serious comorbidity and body mass index (BMI) of 45.0 to 49.9 in adult ([facility] [address] 09/12/2019     Priority: Low    SOB (shortness of breath) 09/12/2019     Priority: Low    Essential hypertension      Priority: Low     1.  Diabetes mellitus long-standing not on insulin. 2.  Mild nonobstructive coronary artery disease with EKG with possible anterolateral and inferior infarct, inferolateral defect on Lexiscan, ejection fraction 60%, cardiac catheterization 10/25/2019 with mid LAD 30%. 3.  Long-standing tobacco use x 50 years, stopped 2018 with COPD on continuous oxygen  4.  Severe obesity with limited ambulation. 5.  CKD stage III  6. History of upper GI bleed  7. Lower blood pressures on right arm. PRESENTATION: Rhonda Green is a 70y.o. year old female presents for follow-up evaluation. She apparently bent over on her wheelchair and hit the side rest and fractured 2 ribs (seventh and eighth) on the left side and now is suffering from the pain as a result. She is on continuous oxygen and this was increased.   Occasional epigastric and retrosternal chest pain but not exertional in nature. Persistent shortness of breath. No further GI bleeding. REVIEW OF SYSTEMS:  Review of Systems   Constitutional: Negative for activity change, fatigue and fever. HENT: Negative for ear pain, hearing loss and tinnitus. Eyes: Negative for discharge and visual disturbance. Respiratory: Positive for shortness of breath. Negative for cough and wheezing. Cardiovascular: Positive for chest pain. Negative for palpitations and leg swelling. Gastrointestinal: Negative for abdominal distention, blood in stool, constipation, diarrhea and vomiting. Endocrine: Negative for cold intolerance, heat intolerance, polydipsia and polyuria. Genitourinary: Negative for dysuria and hematuria. Musculoskeletal: Negative for arthralgias, back pain and myalgias. Skin: Negative for pallor and rash. Neurological: Negative for seizures, syncope, weakness and headaches. Psychiatric/Behavioral: Negative for behavioral problems and dysphoric mood. Past Medical History:      Diagnosis Date    Arthritis     Broken ribs 04/20/2022    rib 7 and 8 left side    Chronic congestive heart failure with left ventricular diastolic dysfunction (HCC)     COPD with acute exacerbation (HCC)     Coronary artery disease involving native coronary artery     Dyspnea     Essential hypertension     Gout     Gout     Hyperlipidemia     Hypomagnesemia     Osteoporosis     Type 2 diabetes mellitus ([facility] [address]        Past Surgical History:      Procedure Laterality Date    CATARACT REMOVAL WITH IMPLANT Left 03/2022    COLONOSCOPY      CYST REMOVAL      TONSILLECTOMY         Medications:  Current Outpatient Medications   Medication Sig Dispense Refill    gabapentin (NEURONTIN) 300 MG capsule Take 300 mg by mouth 4 times daily.        Insulin Degludec (TRESIBA FLEXTOUCH SC) Inject into the skin      amoxicillin (AMOXIL) 875 MG tablet Take 875 mg by mouth      saccharomyces boulardii (FLORASTOR) 250 MG capsule Take 250 mg by mouth 2 times daily      pantoprazole (PROTONIX) 40 MG tablet Take 40 mg by mouth daily      colchicine (COLCRYS) 0.6 MG tablet Take 0.6 mg by mouth daily      metoprolol succinate (TOPROL XL) 25 MG extended release tablet TAKE 1/2 TABLET BY MOUTH EVERY DAY (Patient taking differently: Take 25 mg by mouth daily ) 45 tablet 0    insulin regular (HUMULIN R;NOVOLIN R) 100 UNIT/ML injection Inject into the skin See Admin Instructions SLiding scale.  55 units      Semaglutide (OZEMPIC, 0.25 OR 0.5 MG/DOSE, SC) Inject into the skin once a week      temazepam (RESTORIL) 15 MG capsule       Ascorbic Acid (VITAMIN C) 250 MG tablet Take 250 mg by mouth daily      Fiber Adult Gummies 2 g CHEW Take by mouth      allopurinol (ZYLOPRIM) 100 MG tablet Take 50 mg by mouth daily       predniSONE (DELTASONE) 5 MG tablet Take 5 mg by mouth 2 times daily as needed       insulin 70-30 (NOVOLIN 70/30) (70-30) 100 UNIT per ML injection vial 40 in AM  30 in evening and 40 in Pm      vitamin D (ERGOCALCIFEROL) 1.25 MG (03099 UT) CAPS capsule Take 50,000 Units by mouth once a week 3 times a week      Magnesium Oxide (MAGNESIUM-OXIDE) 250 MG TABS tablet Take 250 mg by mouth daily       folic acid (FOLVITE) 1 MG tablet Take 1 mg by mouth daily       Exenatide (BYDUREON SC) Inject into the skin once a week      ibuprofen (ADVIL;MOTRIN) 200 MG tablet Take 200 mg by mouth every 6 hours as needed for Pain      OXYGEN Inhale into the lungs      Omega-3 Fatty Acids (FISH OIL PO) Take 2,000 mg by mouth daily       aspirin-acetaminophen-caffeine (EXCEDRIN MIGRAINE) 250-250-65 MG per tablet Take 1 tablet by mouth every 6 hours as needed for Headaches      furosemide (LASIX) 40 MG tablet Take 40 mg by mouth 2 times daily      gemfibrozil (LOPID) 600 MG tablet Take 600 mg by mouth 2 times daily (before meals)      Linaclotide (LINZESS) 72 MCG CAPS Take 72 mcg by mouth daily      nystatin-triamcinolone (MYCOLOG II) 587414-7.1 UNIT/GM-% cream Apply topically as needed       Probiotic Product (PROBIOTIC ADVANCED PO) Take 1 tablet by mouth daily       CANNABIDIOL PO Take by mouth daily       albuterol (PROVENTIL) 2 MG tablet Take 2 mg by mouth 2 times daily       ezetimibe (ZETIA) 10 MG tablet Take 10 mg by mouth daily      montelukast (SINGULAIR) 10 MG tablet Take 10 mg by mouth nightly      Prenatal Vit-Fe Fumarate-FA (PX PRENATAL MULTIVITAMINS PO) Take 1 tablet by mouth daily       insulin glargine (LANTUS) 100 UNIT/ML injection vial Inject 65 Units into the skin nightly (Patient not taking: Reported on 2022)      insulin glargine (BASAGLAR KWIKPEN) 100 UNIT/ML injection pen Inject into the skin nightly 50 units at bedtime (Patient not taking: Reported on 10/12/2021)      acetaminophen (TYLENOL) 500 MG tablet Take 500 mg by mouth every 6 hours as needed for Pain (Patient not taking: Reported on 2022)      glimepiride (AMARYL) 2 MG tablet Take 2 mg by mouth 2 times daily  (Patient not taking: Reported on 2022)      levalbuterol (XOPENEX) 1.25 MG/3ML nebulizer solution Inhale 1 ampule into the lungs every 4 hours as needed        No current facility-administered medications for this visit.        Allergies:  Grapefruit concentrate, Jardiance [empagliflozin], Tetanus immune globulin, Trulicity [dulaglutide], and Zithromax [azithromycin]    Past Social History:  Social History     Socioeconomic History    Marital status:      Spouse name: Not on file    Number of children: Not on file    Years of education: Not on file    Highest education level: Not on file   Occupational History    Not on file   Tobacco Use    Smoking status: Former Smoker     Packs/day: 1.00     Years: 50.00     Pack years: 50.00     Types: Cigarettes     Quit date: 2018     Years since quittin.2    Smokeless tobacco: Never Used   Vaping Use    Vaping Use: Never used   Substance and Sexual Activity    Alcohol use: Yes     Comment: occassionally    Drug use: Never    Sexual activity: Not on file   Other Topics Concern    Not on file   Social History Narrative    Not on file     Social Determinants of Health     Financial Resource Strain:     Difficulty of Paying Living Expenses: Not on file   Food Insecurity:     Worried About Running Out of Food in the Last Year: Not on file    Zeyad of Food in the Last Year: Not on file   Transportation Needs:     Lack of Transportation (Medical): Not on file    Lack of Transportation (Non-Medical): Not on file   Physical Activity:     Days of Exercise per Week: Not on file    Minutes of Exercise per Session: Not on file   Stress:     Feeling of Stress : Not on file   Social Connections:     Frequency of Communication with Friends and Family: Not on file    Frequency of Social Gatherings with Friends and Family: Not on file    Attends [employer] Services: Not on file    Active Member of [address] [place] [address] or Organizations: Not on file    Attends Club or Organization Meetings: Not on file    Marital Status: Not on file   Intimate Partner Violence:     Fear of Current or Ex-Partner: Not on file    Emotionally Abused: Not on file    Physically Abused: Not on file    Sexually Abused: Not on file   Housing Stability:     Unable to Pay for Housing in the Last Year: Not on file    Number of [place] in the Last Year: Not on file    Unstable Housing in the Last Year: Not on file       Family History:   History reviewed. No pertinent family history. Physical Examination:  /62   Pulse 96   Ht 5' 6\" (1.676 m)   Wt 265 lb (120.2 kg)   BMI 42.77 kg/m²   Physical Exam  Constitutional:       General: She is not in acute distress. Appearance: She is not diaphoretic.       Comments: Morbid obesity  On continuous O2  Blood pressure left arm 100/40 mmHg, right arm 60/40 mmHg, pulse 84 bpm regular   HENT:      Mouth/Throat:      Pharynx: No oropharyngeal exudate. Eyes:      General: No scleral icterus. Right eye: No discharge. Left eye: No discharge. Neck:      Thyroid: No thyromegaly. Vascular: No carotid bruit or JVD. Cardiovascular:      Rate and Rhythm: Normal rate and regular rhythm. No extrasystoles are present. Heart sounds: Normal heart sounds, S1 normal and S2 normal. No murmur heard. No systolic murmur is present. No diastolic murmur is present. No friction rub. No gallop. No S3 or S4 sounds. Comments: No JVD appreciated  No pitting edema  No significant systolic or diastolic murmurs noted  Pulmonary:      Effort: Pulmonary effort is normal. No respiratory distress. Breath sounds: Normal breath sounds. No wheezing or rales. Comments: No wheezing  Tenderness along left rib cage laterally    Chest:      Chest wall: No tenderness. Abdominal:      General: Bowel sounds are normal. There is no distension. Palpations: Abdomen is soft. There is no mass. Tenderness: There is no abdominal tenderness. There is no guarding or rebound. Hernia: No hernia is present. Comments: Difficult exam  No palpable organomegaly   Musculoskeletal:         General: Normal range of motion. Skin:     General: Skin is warm. Coloration: Skin is not pale. Findings: No rash. Neurological:      Mental Status: She is alert and oriented to person, place, and time. Cranial Nerves: No cranial nerve deficit. Deep Tendon Reflexes: Reflexes normal.           Labs:   CBC: No results for input(s): WBC, HGB, HCT, PLT in the last 72 hours. BMP:No results for input(s): NA, K, CO2, BUN, CREATININE, LABGLOM, GLUCOSE in the last 72 hours. BNP: No results for input(s): BNP in the last 72 hours. PT/INR: No results for input(s): PROTIME, INR in the last 72 hours. APTT:No results for input(s): APTT in the last 72 hours.   CARDIAC ENZYMES:No results for input(s): CKTOTAL, CKMB, CKMBINDEX, TROPONINI in the last 72 hours. FASTING LIPID PANEL:  Lab Results   Component Value Date    HDL 31 10/16/2019    LDLCALC 86 10/16/2019    TRIG 299 10/16/2019     LIVER PROFILE:No results for input(s): AST, ALT, LABALBU in the last 72 hours. Imaging:          ASSESSMENT and PLAN:    79-year-old female patient with morbid obesity, long-standing diabetes mellitus, non-insulin-requiring, long-standing tobacco use stopped 2018 with COPD on oxygen, CKD stage III, EKG evidence for possible anterolateral and inferior infarct, abnormal Lexiscan for inferolateral defect with normal LV function, cardiac catheterization 10/25/2019 with mild nonobstructive CAD here for follow-up. 1.  Has been doing fairly well until she fractured her left ribs. Has had chest x-ray done. No abnormality reported other than fracture and followed by primary. Can strap her side if the pain is excessive. 2.  Continue current medications. Blood pressures to be monitored on the left arm only. 3.  Follow-up with nurse practitioner in 4 months and with me in 8 months. Orders:  No orders of the defined types were placed in this encounter. No orders of the defined types were placed in this encounter. Return for NP 4 mths; me 8 mths. Electronically signed by Buzz De Anda MD on 4/27/2022 at 1:35 PM    [facility]      Thisdictation was generated by voice recognition computer software. Although all attempts are made to edit the dictation for accuracy, there may be errors in the transcription that are not intended.

## 2022-06-07 RX ORDER — METOPROLOL SUCCINATE 25 MG/1
25 TABLET, EXTENDED RELEASE ORAL DAILY
Qty: 45 TABLET | Refills: 3 | Status: SHIPPED | OUTPATIENT
Start: 2022-06-07 | End: 2022-06-16 | Stop reason: CLARIF

## 2022-06-16 RX ORDER — METOPROLOL SUCCINATE 25 MG/1
12.5 TABLET, EXTENDED RELEASE ORAL DAILY
Qty: 45 TABLET | Refills: 1 | Status: SHIPPED | OUTPATIENT
Start: 2022-06-16

## 2022-10-05 ENCOUNTER — OFFICE VISIT (OUTPATIENT)
Dept: CARDIOLOGY CLINIC | Age: 72
End: 2022-10-05
Payer: MEDICARE

## 2022-10-05 VITALS
DIASTOLIC BLOOD PRESSURE: 62 MMHG | SYSTOLIC BLOOD PRESSURE: 116 MMHG | HEART RATE: 90 BPM | HEIGHT: 66 IN | BODY MASS INDEX: 39.37 KG/M2 | WEIGHT: 245 LBS

## 2022-10-05 DIAGNOSIS — R06.02 SHORTNESS OF BREATH: ICD-10-CM

## 2022-10-05 DIAGNOSIS — I47.1 SVT (SUPRAVENTRICULAR TACHYCARDIA) (HCC): Primary | ICD-10-CM

## 2022-10-05 PROCEDURE — 1123F ACP DISCUSS/DSCN MKR DOCD: CPT | Performed by: INTERNAL MEDICINE

## 2022-10-05 PROCEDURE — G8417 CALC BMI ABV UP PARAM F/U: HCPCS | Performed by: INTERNAL MEDICINE

## 2022-10-05 PROCEDURE — 1036F TOBACCO NON-USER: CPT | Performed by: INTERNAL MEDICINE

## 2022-10-05 PROCEDURE — G8400 PT W/DXA NO RESULTS DOC: HCPCS | Performed by: INTERNAL MEDICINE

## 2022-10-05 PROCEDURE — 1090F PRES/ABSN URINE INCON ASSESS: CPT | Performed by: INTERNAL MEDICINE

## 2022-10-05 PROCEDURE — 93000 ELECTROCARDIOGRAM COMPLETE: CPT | Performed by: INTERNAL MEDICINE

## 2022-10-05 PROCEDURE — 99214 OFFICE O/P EST MOD 30 MIN: CPT | Performed by: INTERNAL MEDICINE

## 2022-10-05 PROCEDURE — G8427 DOCREV CUR MEDS BY ELIG CLIN: HCPCS | Performed by: INTERNAL MEDICINE

## 2022-10-05 PROCEDURE — G8484 FLU IMMUNIZE NO ADMIN: HCPCS | Performed by: INTERNAL MEDICINE

## 2022-10-05 PROCEDURE — 3017F COLORECTAL CA SCREEN DOC REV: CPT | Performed by: INTERNAL MEDICINE

## 2022-10-05 RX ORDER — FAMOTIDINE 10 MG
10 TABLET ORAL 2 TIMES DAILY
COMMUNITY

## 2022-10-05 ASSESSMENT — ENCOUNTER SYMPTOMS
EYE DISCHARGE: 0
VOMITING: 0
BLOOD IN STOOL: 0
CONSTIPATION: 0
SHORTNESS OF BREATH: 0
BACK PAIN: 0
ABDOMINAL DISTENTION: 0
DIARRHEA: 0
COUGH: 0
WHEEZING: 0

## 2022-10-05 NOTE — PROGRESS NOTES
[facility]  Cardiology Office Note  [facility] [place]  [address]  Phone: [phone]  Fax: [phone]                            Date:  10/5/2022  Patient: Lauren Soares  Age:  70 y. o., 1950    Referral: No ref. provider found      PROBLEM LIST:    Patient Active Problem List    Diagnosis Date Noted    Coronary artery disease involving native coronary artery of native heart      Priority: High    Chest pain 07/28/2020     Priority: Low    Family history of coronary artery disease 10/15/2019     Priority: Low    Abnormal nuclear stress test 10/15/2019     Priority: Low    Diastolic heart failure ([facility] [address] 09/12/2019     Priority: Low    Mixed hyperlipidemia 09/12/2019     Priority: Low    Hx of chronic kidney disease 09/12/2019     Priority: Low    Class 3 severe obesity due to excess calories with serious comorbidity and body mass index (BMI) of 45.0 to 49.9 in adult ([facility] [address] 09/12/2019     Priority: Low    SOB (shortness of breath) 09/12/2019     Priority: Low    Essential hypertension      Priority: Low     1. Diabetes mellitus long-standing not on insulin. 2.  Mild nonobstructive coronary artery disease with EKG with possible anterolateral and inferior infarct, inferolateral defect on Lexiscan, ejection fraction 60%, cardiac catheterization 10/25/2019 with mid LAD 30%. 3.  Long-standing tobacco use x 50 years, stopped 2018 with COPD on continuous oxygen  4. Severe obesity with limited ambulation. 5.  CKD stage 4  6. History of upper GI bleed  7. Lower blood pressures on right arm. PRESENTATION: Lauren Soares is a 70y.o. year old female presents for follow-up evaluation. Her rib fractures have healed. She is complaining about her right knee and wants her knee replacement. Renal functions have apparently deteriorated with creatinine of 2.8. No significant leg swelling. No chest pains or shortness of breath.     REVIEW OF SYSTEMS:  Review of Systems   Constitutional:  Negative for activity change, fatigue and fever. HENT:  Negative for ear pain, hearing loss and tinnitus. Eyes:  Negative for discharge and visual disturbance. Respiratory:  Negative for cough, shortness of breath and wheezing. Cardiovascular:  Negative for chest pain, palpitations and leg swelling. Gastrointestinal:  Negative for abdominal distention, blood in stool, constipation, diarrhea and vomiting. Endocrine: Negative for cold intolerance, heat intolerance, polydipsia and polyuria. Genitourinary:  Negative for dysuria and hematuria. Musculoskeletal:  Positive for arthralgias and gait problem. Negative for back pain and myalgias. Skin:  Negative for pallor and rash. Neurological:  Negative for seizures, syncope, weakness and headaches. Psychiatric/Behavioral:  Negative for behavioral problems and dysphoric mood. Past Medical History:      Diagnosis Date    Arthritis     Broken ribs 04/20/2022    rib 7 and 8 left side    Chronic congestive heart failure with left ventricular diastolic dysfunction ([facility]     COPD with acute exacerbation (HCC)     Coronary artery disease involving native coronary artery     Dyspnea     Essential hypertension     Gout     Gout     Hyperlipidemia     Hypomagnesemia     Osteoporosis     Type 2 diabetes mellitus ([facility]        Past Surgical History:      Procedure Laterality Date    CATARACT EXTRACTION W/  INTRAOCULAR LENS IMPLANT Left 03/2022    COLONOSCOPY      CYST REMOVAL      TONSILLECTOMY         Medications:  Current Outpatient Medications   Medication Sig Dispense Refill    famotidine (PEPCID) 10 MG tablet Take 10 mg by mouth 2 times daily      metoprolol succinate (TOPROL XL) 25 MG extended release tablet Take 0.5 tablets by mouth daily 45 tablet 1    gabapentin (NEURONTIN) 300 MG capsule Take 300 mg by mouth in the morning and at bedtime.       Insulin Degludec (TRESIBA FLEXTOUCH SC) Inject into the skin      saccharomyces boulardii (FLORASTOR) 250 MG capsule Take 250 mg by mouth 2 times daily      colchicine (COLCRYS) 0.6 MG tablet Take 0.6 mg by mouth daily      insulin regular (HUMULIN R;NOVOLIN R) 100 UNIT/ML injection Inject into the skin See Admin Instructions SLiding scale.  55 units      Semaglutide (OZEMPIC, 0.25 OR 0.5 MG/DOSE, SC) Inject into the skin once a week      temazepam (RESTORIL) 15 MG capsule       Ascorbic Acid (VITAMIN C) 250 MG tablet Take 250 mg by mouth daily      Fiber Adult Gummies 2 g CHEW Take by mouth      allopurinol (ZYLOPRIM) 100 MG tablet Take 50 mg by mouth daily       predniSONE (DELTASONE) 5 MG tablet Take 5 mg by mouth 2 times daily as needed       insulin 70-30 (NOVOLIN 70/30) (70-30) 100 UNIT per ML injection vial 40 in AM  30 in evening and 40 in Pm      vitamin D (ERGOCALCIFEROL) 1.25 MG (53196 UT) CAPS capsule Take 50,000 Units by mouth once a week 3 times a week      Magnesium Oxide (MAGNESIUM-OXIDE) 250 MG TABS tablet Take 250 mg by mouth daily       folic acid (FOLVITE) 1 MG tablet Take 1 mg by mouth daily       Exenatide (BYDUREON SC) Inject into the skin once a week      OXYGEN Inhale into the lungs      Omega-3 Fatty Acids (FISH OIL PO) Take 2,000 mg by mouth daily       aspirin-acetaminophen-caffeine (EXCEDRIN MIGRAINE) 250-250-65 MG per tablet Take 1 tablet by mouth every 6 hours as needed for Headaches      glimepiride (AMARYL) 2 MG tablet Take 2 mg by mouth 2 times daily      furosemide (LASIX) 40 MG tablet Take 40 mg by mouth 2 times daily      gemfibrozil (LOPID) 600 MG tablet Take 600 mg by mouth 2 times daily (before meals)      linaCLOtide (LINZESS) 72 MCG CAPS capsule Take 72 mcg by mouth daily      nystatin-triamcinolone (MYCOLOG II) 468855-4.1 UNIT/GM-% cream Apply topically as needed       Probiotic Product (PROBIOTIC ADVANCED PO) Take 1 tablet by mouth daily       levalbuterol (XOPENEX) 1.25 MG/3ML nebulizer solution Inhale 1 ampule into the lungs every 4 hours as needed CANNABIDIOL PO Take by mouth daily       albuterol (PROVENTIL) 2 MG tablet Take 2 mg by mouth 2 times daily       ezetimibe (ZETIA) 10 MG tablet Take 10 mg by mouth daily      montelukast (SINGULAIR) 10 MG tablet Take 10 mg by mouth nightly      Prenatal Vit-Fe Fumarate-FA (PX PRENATAL MULTIVITAMINS PO) Take 1 tablet by mouth daily       amoxicillin (AMOXIL) 875 MG tablet Take 875 mg by mouth (Patient not taking: Reported on 10/5/2022)      pantoprazole (PROTONIX) 40 MG tablet Take 40 mg by mouth daily (Patient not taking: Reported on 10/5/2022)      insulin glargine (LANTUS) 100 UNIT/ML injection vial Inject 65 Units into the skin nightly (Patient not taking: No sig reported)      insulin glargine (BASAGLAR KWIKPEN) 100 UNIT/ML injection pen Inject into the skin nightly 50 units at bedtime (Patient not taking: No sig reported)      ibuprofen (ADVIL;MOTRIN) 200 MG tablet Take 200 mg by mouth every 6 hours as needed for Pain (Patient not taking: Reported on 10/5/2022)      acetaminophen (TYLENOL) 500 MG tablet Take 500 mg by mouth every 6 hours as needed for Pain (Patient not taking: No sig reported)       No current facility-administered medications for this visit.        Allergies:  Grapefruit concentrate, Jardiance [empagliflozin], Tetanus immune globulin, Trulicity [dulaglutide], and Zithromax [azithromycin]    Past Social History:  Social History     Socioeconomic History    Marital status:      Spouse name: Not on file    Number of children: Not on file    Years of education: Not on file    Highest education level: Not on file   Occupational History    Not on file   Tobacco Use    Smoking status: Former     Packs/day: 1.00     Years: 50.00     Pack years: 50.00     Types: Cigarettes     Quit date: 2018     Years since quittin.6    Smokeless tobacco: Never   Vaping Use    Vaping Use: Never used   Substance and Sexual Activity    Alcohol use: Yes     Comment: occassionally    Drug use: Never Sexual activity: Not on file   Other Topics Concern    Not on file   Social History Narrative    Not on file     Social Determinants of Health     Financial Resource Strain: Not on file   Food Insecurity: Not on file   Transportation Needs: Not on file   Physical Activity: Not on file   Stress: Not on file   Social Connections: Not on file   Intimate Partner Violence: Not on file   Housing Stability: Not on file       Family History:   No family history on file. Physical Examination:  /62   Pulse 90   Ht 5' 6\" (1.676 m)   Wt 245 lb (111.1 kg)   BMI 39.54 kg/m²   Physical Exam  Constitutional:       General: She is not in acute distress. Appearance: She is not diaphoretic. Comments: Severe obesity  Blood pressure left arm sitting 110/50 mmHg, right arm 68/40 mmHg  Pulse 70 bpm regular   HENT:      Mouth/Throat:      Pharynx: No oropharyngeal exudate. Eyes:      General: No scleral icterus. Right eye: No discharge. Left eye: No discharge. Neck:      Thyroid: No thyromegaly. Vascular: No JVD. Cardiovascular:      Rate and Rhythm: Normal rate and regular rhythm. No extrasystoles are present. Heart sounds: Normal heart sounds, S1 normal and S2 normal. No murmur heard. No systolic murmur is present. No diastolic murmur is present. No friction rub. No gallop. No S3 or S4 sounds. Comments: No JVD  No edema  No significant systolic or diastolic murmurs noted  Pulmonary:      Effort: Pulmonary effort is normal. No respiratory distress. Breath sounds: Normal breath sounds. No wheezing or rales. Chest:      Chest wall: No tenderness. Abdominal:      General: Bowel sounds are normal. There is no distension. Palpations: Abdomen is soft. There is no mass. Tenderness: There is no abdominal tenderness. There is no guarding or rebound. Hernia: No hernia is present.       Comments: Soft, nontender  No palpable organomegaly   Musculoskeletal: General: Normal range of motion. Skin:     General: Skin is warm. Coloration: Skin is not pale. Findings: No rash. Neurological:      Mental Status: She is alert and oriented to person, place, and time. Cranial Nerves: No cranial nerve deficit. Deep Tendon Reflexes: Reflexes normal.         Labs:   CBC: No results for input(s): WBC, HGB, HCT, PLT in the last 72 hours. BMP:No results for input(s): NA, K, CO2, BUN, CREATININE, LABGLOM, GLUCOSE in the last 72 hours. BNP: No results for input(s): BNP in the last 72 hours. PT/INR: No results for input(s): PROTIME, INR in the last 72 hours. APTT:No results for input(s): APTT in the last 72 hours. CARDIAC ENZYMES:No results for input(s): CKTOTAL, CKMB, CKMBINDEX, TROPONINI in the last 72 hours. FASTING LIPID PANEL:  Lab Results   Component Value Date/Time    HDL 31 10/16/2019 09:22 AM    LDLCALC 86 10/16/2019 09:22 AM    TRIG 299 10/16/2019 09:22 AM     LIVER PROFILE:No results for input(s): AST, ALT, LABALBU in the last 72 hours. Imaging:          ASSESSMENT and PLAN:    70-year-old female patient with morbid obesity, long-standing diabetes mellitus, non-insulin-requiring, long-standing tobacco use stopped 2018 with COPD on oxygen, CKD stage III, EKG evidence for possible anterolateral and inferior infarct, abnormal Lexiscan for inferolateral defect with normal LV function, cardiac catheterization 10/25/2019 with mild nonobstructive CAD here for follow-up. 1.  Blood pressures to be taken on left arm only. Low pressures on right arm of unclear etiology. Blood pressures appear fairly well controlled. Apparently she was taken off Altace due to worsening in renal functions. Creatinine noted to be 2.8. Currently would continue the same unchanged as blood pressures are stable. 2.  From a cardiac perspective she may proceed with intermediate risk knee surgery. She does run the risk of renal compromise with surgery.   This has been discussed with her nephrologist.  3.  Advised on diet and activity. EKG unchanged with evidence of anterolateral and inferior infarct. Sinus rhythm. 4.  Can follow-up with nurse practitioner in 6 months and with me in 1 year. Orders:  Orders Placed This Encounter   Procedures    EKG 12 lead     No orders of the defined types were placed in this encounter. Return for NP 6 mths; me 1 year. Electronically signed by Tahir Bowen MD on 10/5/2022 at 1:14 PM    [facility]      Thisdictation was generated by voice recognition computer software. Although all attempts are made to edit the dictation for accuracy, there may be errors in the transcription that are not intended.

## 2023-04-06 ENCOUNTER — OFFICE VISIT (OUTPATIENT)
Dept: CARDIOLOGY CLINIC | Age: 73
End: 2023-04-06
Payer: MEDICARE

## 2023-04-06 VITALS
WEIGHT: 244 LBS | BODY MASS INDEX: 39.21 KG/M2 | HEART RATE: 87 BPM | DIASTOLIC BLOOD PRESSURE: 74 MMHG | HEIGHT: 66 IN | SYSTOLIC BLOOD PRESSURE: 122 MMHG | OXYGEN SATURATION: 95 %

## 2023-04-06 DIAGNOSIS — I50.32 DIASTOLIC DYSFUNCTION WITH CHRONIC HEART FAILURE (HCC): ICD-10-CM

## 2023-04-06 DIAGNOSIS — E78.5 DYSLIPIDEMIA: ICD-10-CM

## 2023-04-06 DIAGNOSIS — I25.10 CORONARY ARTERY DISEASE INVOLVING NATIVE CORONARY ARTERY OF NATIVE HEART WITHOUT ANGINA PECTORIS: Primary | ICD-10-CM

## 2023-04-06 DIAGNOSIS — I10 ESSENTIAL HYPERTENSION: ICD-10-CM

## 2023-04-06 PROCEDURE — 1090F PRES/ABSN URINE INCON ASSESS: CPT | Performed by: NURSE PRACTITIONER

## 2023-04-06 PROCEDURE — 1123F ACP DISCUSS/DSCN MKR DOCD: CPT | Performed by: NURSE PRACTITIONER

## 2023-04-06 PROCEDURE — 3074F SYST BP LT 130 MM HG: CPT | Performed by: NURSE PRACTITIONER

## 2023-04-06 PROCEDURE — G8427 DOCREV CUR MEDS BY ELIG CLIN: HCPCS | Performed by: NURSE PRACTITIONER

## 2023-04-06 PROCEDURE — 3078F DIAST BP <80 MM HG: CPT | Performed by: NURSE PRACTITIONER

## 2023-04-06 PROCEDURE — G8400 PT W/DXA NO RESULTS DOC: HCPCS | Performed by: NURSE PRACTITIONER

## 2023-04-06 PROCEDURE — G8417 CALC BMI ABV UP PARAM F/U: HCPCS | Performed by: NURSE PRACTITIONER

## 2023-04-06 PROCEDURE — 99214 OFFICE O/P EST MOD 30 MIN: CPT | Performed by: NURSE PRACTITIONER

## 2023-04-06 PROCEDURE — 1036F TOBACCO NON-USER: CPT | Performed by: NURSE PRACTITIONER

## 2023-04-06 PROCEDURE — 3017F COLORECTAL CA SCREEN DOC REV: CPT | Performed by: NURSE PRACTITIONER

## 2023-04-06 ASSESSMENT — ENCOUNTER SYMPTOMS
SORE THROAT: 0
SHORTNESS OF BREATH: 0
COUGH: 0
WHEEZING: 0
CHEST TIGHTNESS: 0

## 2023-04-06 NOTE — PROGRESS NOTES
no thyromegaly, no JVD, trachea is in the midline  CARDIOVASCULAR - PMI is in the mid line clavicular position, Normal S1 and S2 with no systolic murmur. No S3 or S4    PULMONARY - no respiratory distress. No wheezes or rales. Lungs are clear to ausculation, normal respiratory effort. ABDOMEN  - soft, non tender, no rebound  MUSCULOSKELETAL  - range of motion of the upper and lower extermites appears normal and equal and is without pain   EXTREMITIES - no significant edema   NEUROLOGIC - gait and station are normal  SKIN - turgor is normal, can warm and dry. PSYCHIATRIC - normal mood and affect, alert and orientated x 3,      ASSESSMENT:    ALL THE CARDIOLOGY PROBLEMS ARE LISTED ABOVE; HOWEVER, THE FOLLOWING SPECIFIC CARDIAC PROBLEMS / CONDITIONS WERE ADDRESSED AND TREATED DURING THE OFFICE VISIT TODAY:                                                                                            MEDICAL DECISION MAKING             Cardiac Specific Problem / Diagnosis  Discussion and Data Reviewed Diagnostic Procedures Ordered Management Options Selected           1. CAD  Stable   Review and summation of old records:    No chest pain. Patient is on Toprol, Zetia, Lopid No Continue current medications:     Yes           2. Dyslipidemia  Stable   Patient is on Zetia and Lopid. Lipid profile 3/24/2023. Total cholesterol 128. HDL 38. LDL 73. No Continue current medications:    Yes           3. Hypertension Stable Blood pressure in the office today 122/74. O2 sat 95%. Patient is on Toprol-XL 12.5 mg daily. No Continue current medications:       Yes           4. Diastolic dysfunction Stable No overt signs of failure on Toprol, Lasix. No Continue current medications:       Yes     No orders of the defined types were placed in this encounter. No orders of the defined types were placed in this encounter. Discussed with patient and spouse. Return for Keep appointment with Dr. Alexa Hammond on October 11, 2023.     I

## 2023-05-01 RX ORDER — METOPROLOL SUCCINATE 25 MG/1
TABLET, EXTENDED RELEASE ORAL
Qty: 45 TABLET | Refills: 1 | Status: SHIPPED | OUTPATIENT
Start: 2023-05-01

## 2023-10-09 RX ORDER — METOPROLOL SUCCINATE 25 MG/1
12.5 TABLET, EXTENDED RELEASE ORAL DAILY
Qty: 45 TABLET | Refills: 4 | Status: SHIPPED | OUTPATIENT
Start: 2023-10-09

## 2023-10-09 NOTE — TELEPHONE ENCOUNTER
Lesia Harada is requesting a refill of their   Requested Prescriptions     Pending Prescriptions Disp Refills    metoprolol succinate (TOPROL XL) 25 MG extended release tablet 45 tablet 1     Sig: Take 0.5 tablets by mouth daily   . Please advise.       Last Appt:  4/6/2023  Next Appt:   10/11/2023  Preferred [facility]

## 2023-10-11 ENCOUNTER — OFFICE VISIT (OUTPATIENT)
Dept: CARDIOLOGY CLINIC | Age: 73
End: 2023-10-11
Payer: MEDICARE

## 2023-10-11 VITALS
WEIGHT: 242 LBS | DIASTOLIC BLOOD PRESSURE: 66 MMHG | HEIGHT: 66 IN | HEART RATE: 91 BPM | BODY MASS INDEX: 38.89 KG/M2 | SYSTOLIC BLOOD PRESSURE: 108 MMHG

## 2023-10-11 DIAGNOSIS — I50.32 DIASTOLIC DYSFUNCTION WITH CHRONIC HEART FAILURE (HCC): ICD-10-CM

## 2023-10-11 DIAGNOSIS — I10 ESSENTIAL HYPERTENSION: ICD-10-CM

## 2023-10-11 DIAGNOSIS — I25.10 CORONARY ARTERY DISEASE INVOLVING NATIVE CORONARY ARTERY OF NATIVE HEART WITHOUT ANGINA PECTORIS: Primary | ICD-10-CM

## 2023-10-11 PROCEDURE — 99214 OFFICE O/P EST MOD 30 MIN: CPT | Performed by: INTERNAL MEDICINE

## 2023-10-11 PROCEDURE — G8400 PT W/DXA NO RESULTS DOC: HCPCS | Performed by: INTERNAL MEDICINE

## 2023-10-11 PROCEDURE — G8427 DOCREV CUR MEDS BY ELIG CLIN: HCPCS | Performed by: INTERNAL MEDICINE

## 2023-10-11 PROCEDURE — G8484 FLU IMMUNIZE NO ADMIN: HCPCS | Performed by: INTERNAL MEDICINE

## 2023-10-11 PROCEDURE — 1090F PRES/ABSN URINE INCON ASSESS: CPT | Performed by: INTERNAL MEDICINE

## 2023-10-11 PROCEDURE — G8417 CALC BMI ABV UP PARAM F/U: HCPCS | Performed by: INTERNAL MEDICINE

## 2023-10-11 PROCEDURE — 1036F TOBACCO NON-USER: CPT | Performed by: INTERNAL MEDICINE

## 2023-10-11 PROCEDURE — 3074F SYST BP LT 130 MM HG: CPT | Performed by: INTERNAL MEDICINE

## 2023-10-11 PROCEDURE — 1123F ACP DISCUSS/DSCN MKR DOCD: CPT | Performed by: INTERNAL MEDICINE

## 2023-10-11 PROCEDURE — 3017F COLORECTAL CA SCREEN DOC REV: CPT | Performed by: INTERNAL MEDICINE

## 2023-10-11 PROCEDURE — 3078F DIAST BP <80 MM HG: CPT | Performed by: INTERNAL MEDICINE

## 2023-10-11 RX ORDER — TESTOSTERONE 20.25 MG/1.25G
1.25 GEL TOPICAL
COMMUNITY

## 2023-10-11 ASSESSMENT — ENCOUNTER SYMPTOMS
BACK PAIN: 0
CONSTIPATION: 0
SHORTNESS OF BREATH: 0
WHEEZING: 0
EYE DISCHARGE: 0
DIARRHEA: 0
ABDOMINAL DISTENTION: 0
VOMITING: 0
BLOOD IN STOOL: 0
COUGH: 0

## 2024-04-11 ENCOUNTER — OFFICE VISIT (OUTPATIENT)
Dept: CARDIOLOGY CLINIC | Age: 74
End: 2024-04-11
Payer: MEDICARE

## 2024-04-11 VITALS
BODY MASS INDEX: 38.57 KG/M2 | WEIGHT: 240 LBS | HEART RATE: 81 BPM | SYSTOLIC BLOOD PRESSURE: 118 MMHG | HEIGHT: 66 IN | OXYGEN SATURATION: 94 % | DIASTOLIC BLOOD PRESSURE: 64 MMHG

## 2024-04-11 DIAGNOSIS — I25.10 CORONARY ARTERY DISEASE INVOLVING NATIVE CORONARY ARTERY OF NATIVE HEART WITHOUT ANGINA PECTORIS: Primary | ICD-10-CM

## 2024-04-11 DIAGNOSIS — E78.5 DYSLIPIDEMIA: ICD-10-CM

## 2024-04-11 DIAGNOSIS — I10 ESSENTIAL HYPERTENSION: ICD-10-CM

## 2024-04-11 PROCEDURE — 3017F COLORECTAL CA SCREEN DOC REV: CPT | Performed by: NURSE PRACTITIONER

## 2024-04-11 PROCEDURE — 3078F DIAST BP <80 MM HG: CPT | Performed by: NURSE PRACTITIONER

## 2024-04-11 PROCEDURE — 3074F SYST BP LT 130 MM HG: CPT | Performed by: NURSE PRACTITIONER

## 2024-04-11 PROCEDURE — 93000 ELECTROCARDIOGRAM COMPLETE: CPT | Performed by: NURSE PRACTITIONER

## 2024-04-11 PROCEDURE — 1090F PRES/ABSN URINE INCON ASSESS: CPT | Performed by: NURSE PRACTITIONER

## 2024-04-11 PROCEDURE — 99214 OFFICE O/P EST MOD 30 MIN: CPT | Performed by: NURSE PRACTITIONER

## 2024-04-11 PROCEDURE — G8417 CALC BMI ABV UP PARAM F/U: HCPCS | Performed by: NURSE PRACTITIONER

## 2024-04-11 PROCEDURE — G8400 PT W/DXA NO RESULTS DOC: HCPCS | Performed by: NURSE PRACTITIONER

## 2024-04-11 PROCEDURE — 1036F TOBACCO NON-USER: CPT | Performed by: NURSE PRACTITIONER

## 2024-04-11 PROCEDURE — G8427 DOCREV CUR MEDS BY ELIG CLIN: HCPCS | Performed by: NURSE PRACTITIONER

## 2024-04-11 PROCEDURE — 1123F ACP DISCUSS/DSCN MKR DOCD: CPT | Performed by: NURSE PRACTITIONER

## 2024-04-11 ASSESSMENT — ENCOUNTER SYMPTOMS
CHEST TIGHTNESS: 0
SHORTNESS OF BREATH: 1
COUGH: 0
WHEEZING: 0

## 2024-04-11 NOTE — PROGRESS NOTES
Yes                     Orders Placed This Encounter   Procedures    EKG 12 lead     Order Specific Question:   Reason for Exam?     Answer:   Other     No orders of the defined types were placed in this encounter.      Discussed with patient and spouse .    Return for Keep appointment with Dr. Rossi..    I greatly appreciate the opportunity to meet Teena James and your confidence in allowing me to participate in her cardiovascular care.    KAMLESH Gomez - NP  4/11/2024 1:18 PM CDT                    This dictation was generated by voice recognition computer software. Although all attempts are made to edit dictation for accuracy, there may be errors in the transcription that are not intended.

## 2024-07-30 ENCOUNTER — TRANSCRIBE ORDERS (OUTPATIENT)
Dept: ADMINISTRATIVE | Facility: HOSPITAL | Age: 74
End: 2024-07-30
Payer: MEDICARE

## 2024-07-30 DIAGNOSIS — K76.0 FATTY LIVER: Primary | ICD-10-CM

## 2024-07-30 DIAGNOSIS — K74.00 LIVER FIBROSIS: ICD-10-CM

## 2024-08-05 ENCOUNTER — TRANSCRIBE ORDERS (OUTPATIENT)
Dept: ADMINISTRATIVE | Facility: HOSPITAL | Age: 74
End: 2024-08-05
Payer: MEDICARE

## 2024-08-05 DIAGNOSIS — K76.0 FATTY LIVER: Primary | ICD-10-CM

## 2024-08-15 ENCOUNTER — HOSPITAL ENCOUNTER (OUTPATIENT)
Dept: ULTRASOUND IMAGING | Facility: HOSPITAL | Age: 74
Discharge: HOME | End: 2024-08-15
Payer: MEDICARE

## 2024-08-15 DIAGNOSIS — K74.00 LIVER FIBROSIS: ICD-10-CM

## 2024-08-15 DIAGNOSIS — K76.0 FATTY LIVER: ICD-10-CM

## 2024-08-15 PROCEDURE — 76981 USE PARENCHYMA: CPT

## 2024-08-15 PROCEDURE — 0690T QUAN US TIS CHARAC W/DX US: CPT

## 2024-08-15 PROCEDURE — 76705 ECHO EXAM OF ABDOMEN: CPT

## 2024-09-13 ENCOUNTER — TELEPHONE (OUTPATIENT)
Dept: CARDIOLOGY CLINIC | Age: 74
End: 2024-09-13

## 2024-11-06 ENCOUNTER — OFFICE VISIT (OUTPATIENT)
Dept: CARDIOLOGY CLINIC | Age: 74
End: 2024-11-06

## 2024-11-06 VITALS
SYSTOLIC BLOOD PRESSURE: 144 MMHG | BODY MASS INDEX: 39.21 KG/M2 | WEIGHT: 244 LBS | HEART RATE: 91 BPM | DIASTOLIC BLOOD PRESSURE: 86 MMHG | HEIGHT: 66 IN

## 2024-11-06 DIAGNOSIS — I25.10 CORONARY ARTERY DISEASE INVOLVING NATIVE CORONARY ARTERY OF NATIVE HEART WITHOUT ANGINA PECTORIS: Primary | ICD-10-CM

## 2024-11-06 DIAGNOSIS — I50.32 DIASTOLIC DYSFUNCTION WITH CHRONIC HEART FAILURE (HCC): ICD-10-CM

## 2024-11-06 DIAGNOSIS — I10 ESSENTIAL HYPERTENSION: ICD-10-CM

## 2024-11-06 RX ORDER — METOPROLOL SUCCINATE 25 MG/1
12.5 TABLET, EXTENDED RELEASE ORAL DAILY
Qty: 45 TABLET | Refills: 3 | Status: SHIPPED | OUTPATIENT
Start: 2024-11-06

## 2024-11-06 ASSESSMENT — ENCOUNTER SYMPTOMS
SHORTNESS OF BREATH: 0
ABDOMINAL DISTENTION: 0
VOMITING: 0
COUGH: 0
BLOOD IN STOOL: 0
DIARRHEA: 0
CONSTIPATION: 0
BACK PAIN: 0
EYE DISCHARGE: 0
WHEEZING: 0

## 2024-11-06 NOTE — PROGRESS NOTES
Findings: No rash.   Neurological:      Mental Status: She is alert and oriented to person, place, and time.      Cranial Nerves: No cranial nerve deficit.      Deep Tendon Reflexes: Reflexes normal.           Labs:   CBC: No results for input(s): \"WBC\", \"HGB\", \"HCT\", \"PLT\" in the last 72 hours.  BMP:No results for input(s): \"NA\", \"K\", \"CO2\", \"BUN\", \"CREATININE\", \"LABGLOM\", \"GLUCOSE\" in the last 72 hours.  BNP: No results for input(s): \"BNP\" in the last 72 hours.  PT/INR: No results for input(s): \"PROTIME\", \"INR\" in the last 72 hours.  APTT:No results for input(s): \"APTT\" in the last 72 hours.  CARDIAC ENZYMES:No results for input(s): \"CKTOTAL\", \"CKMB\", \"CKMBINDEX\", \"TROPONINI\" in the last 72 hours.  FASTING LIPID PANEL:  Lab Results   Component Value Date/Time    HDL 31 10/16/2019 09:22 AM    TRIG 299 10/16/2019 09:22 AM     LIVER PROFILE:No results for input(s): \"AST\", \"ALT\", \"LABALBU\" in the last 72 hours.        Imaging:          ASSESSMENT and PLAN:    73-year-old female patient with morbid obesity, long-standing diabetes mellitus, non-insulin-requiring, long-standing tobacco use stopped 2018 with COPD on oxygen, CKD stage III, EKG evidence for possible anterolateral and inferior infarct, abnormal Lexiscan for inferolateral defect with normal LV function, cardiac catheterization 10/25/2019 with mild nonobstructive CAD here for follow-up.    1.  Have reviewed issues with her.  No significant CAD despite abnormal EKG.  She may proceed with intermediate risk orthopedic surgery as clinically indicated.  Atypical chest pain consistent with costochondritis.  Have reassured her in this regard.  2.  Blood pressure appears fairly well-controlled.  Her renal functions have been stable.  She consumes increased fluid and takes a significant amount of diuretic.  She might want to reduce her fluid intake if she wishes to reduce her diuretic dosing.  Advised on diet and activity.  3.  Can follow-up with nurse

## 2025-02-20 ENCOUNTER — TELEPHONE (OUTPATIENT)
Dept: CARDIOLOGY CLINIC | Age: 75
End: 2025-02-20

## 2025-02-20 NOTE — TELEPHONE ENCOUNTER
Patient left message that her Nephrology provider is wanting to put her back on ramipril 2.5 mg daily. She wanted to make sure that was ok with cardiology.

## 2025-02-21 NOTE — TELEPHONE ENCOUNTER
Left message with Dr. Rossi's advisement ok to start ramipril, make sure renal functions are monitored.

## 2025-05-08 ENCOUNTER — OFFICE VISIT (OUTPATIENT)
Dept: CARDIOLOGY CLINIC | Age: 75
End: 2025-05-08
Payer: MEDICARE

## 2025-05-08 VITALS
HEIGHT: 67 IN | SYSTOLIC BLOOD PRESSURE: 132 MMHG | BODY MASS INDEX: 35.94 KG/M2 | DIASTOLIC BLOOD PRESSURE: 82 MMHG | HEART RATE: 92 BPM | WEIGHT: 229 LBS | OXYGEN SATURATION: 92 %

## 2025-05-08 DIAGNOSIS — I25.10 CORONARY ARTERY DISEASE INVOLVING NATIVE CORONARY ARTERY OF NATIVE HEART WITHOUT ANGINA PECTORIS: Primary | ICD-10-CM

## 2025-05-08 DIAGNOSIS — I10 ESSENTIAL HYPERTENSION: ICD-10-CM

## 2025-05-08 DIAGNOSIS — I50.32 DIASTOLIC DYSFUNCTION WITH CHRONIC HEART FAILURE (HCC): ICD-10-CM

## 2025-05-08 PROCEDURE — 1090F PRES/ABSN URINE INCON ASSESS: CPT | Performed by: NURSE PRACTITIONER

## 2025-05-08 PROCEDURE — 3079F DIAST BP 80-89 MM HG: CPT | Performed by: NURSE PRACTITIONER

## 2025-05-08 PROCEDURE — 3017F COLORECTAL CA SCREEN DOC REV: CPT | Performed by: NURSE PRACTITIONER

## 2025-05-08 PROCEDURE — 1123F ACP DISCUSS/DSCN MKR DOCD: CPT | Performed by: NURSE PRACTITIONER

## 2025-05-08 PROCEDURE — 99214 OFFICE O/P EST MOD 30 MIN: CPT | Performed by: NURSE PRACTITIONER

## 2025-05-08 PROCEDURE — 93000 ELECTROCARDIOGRAM COMPLETE: CPT | Performed by: NURSE PRACTITIONER

## 2025-05-08 PROCEDURE — G8428 CUR MEDS NOT DOCUMENT: HCPCS | Performed by: NURSE PRACTITIONER

## 2025-05-08 PROCEDURE — 3075F SYST BP GE 130 - 139MM HG: CPT | Performed by: NURSE PRACTITIONER

## 2025-05-08 PROCEDURE — G8400 PT W/DXA NO RESULTS DOC: HCPCS | Performed by: NURSE PRACTITIONER

## 2025-05-08 PROCEDURE — 1036F TOBACCO NON-USER: CPT | Performed by: NURSE PRACTITIONER

## 2025-05-08 PROCEDURE — G8417 CALC BMI ABV UP PARAM F/U: HCPCS | Performed by: NURSE PRACTITIONER

## 2025-05-08 RX ORDER — BUDESONIDE 0.5 MG/2ML
1 INHALANT ORAL 2 TIMES DAILY
COMMUNITY
Start: 2025-04-01

## 2025-05-08 RX ORDER — HUMAN INSULIN 100 [IU]/ML
INJECTION, SOLUTION SUBCUTANEOUS
COMMUNITY

## 2025-05-08 RX ORDER — RAMIPRIL 10 MG/1
10 CAPSULE ORAL DAILY
COMMUNITY

## 2025-05-08 ASSESSMENT — ENCOUNTER SYMPTOMS
CHEST TIGHTNESS: 0
SHORTNESS OF BREATH: 0
COUGH: 0
WHEEZING: 0
SORE THROAT: 0

## 2025-05-08 NOTE — PROGRESS NOTES
[facility]   Established Patient Office Visit  [address]  [address]  [facility] [id number] [phone]        OFFICE VISIT:  2025    Teena James - : 1950    Reason For Visit:  Teena is a 74 y.o. female who is here for 6 Month Follow-Up and Coronary Artery Disease    1. Coronary artery disease involving native coronary artery of native heart without angina pectoris    2. Essential hypertension    3. Diastolic dysfunction with chronic heart failure (HCC)        1.  Diabetes mellitus long-standing not on insulin.  2.  Mild nonobstructive coronary artery disease with EKG with possible anterolateral and inferior infarct, inferolateral defect on Lexiscan, ejection fraction 60%, cardiac catheterization 10/25/2019 with mid LAD 30%.  3.  Long-standing tobacco use x 50 years, stopped 2018 with COPD on continuous oxygen  4.  Severe obesity with limited ambulation.  5.  CKD stage 4  6.  History of upper GI bleed  7.  Lower blood pressures on right arm.    She is a patient of Dr. Rossi.    Patient presents to clinic today for 6-month follow-up.  Patient denies any chest pain, pressure or tightness.  There is no shortness of breath, orthopnea or PND.  Patient denies any lightheadedness, dizziness or syncope.  Patient has had right knee surgery and doing well after.    Subjective    Teena James is a 74 y.o. female with the following history as recorded in [facility]:    Patient Active Problem List    Diagnosis Date Noted    Coronary artery disease involving native coronary artery of native heart     Chest pain 2020    Family history of coronary artery disease 10/15/2019    Abnormal nuclear stress test 10/15/2019    Diastolic heart failure (HCC) 2019    Mixed hyperlipidemia 2019    Hx of chronic kidney disease 2019    Class 3 severe obesity due to excess calories with serious comorbidity and body mass index (BMI) of 45.0 to 49.9 in adult (HCC) 2019    SOB (shortness

## 2025-05-12 ENCOUNTER — TELEPHONE (OUTPATIENT)
Dept: CARDIOLOGY CLINIC | Age: 75
End: 2025-05-12

## 2025-09-02 RX ORDER — METOPROLOL SUCCINATE 25 MG/1
12.5 TABLET, EXTENDED RELEASE ORAL DAILY
Qty: 45 TABLET | Refills: 1 | Status: SHIPPED | OUTPATIENT
Start: 2025-09-02